# Patient Record
Sex: FEMALE | Race: WHITE | NOT HISPANIC OR LATINO | Employment: FULL TIME | URBAN - METROPOLITAN AREA
[De-identification: names, ages, dates, MRNs, and addresses within clinical notes are randomized per-mention and may not be internally consistent; named-entity substitution may affect disease eponyms.]

---

## 2017-02-08 ENCOUNTER — COMMUNICATION - HEALTHEAST (OUTPATIENT)
Dept: FAMILY MEDICINE | Facility: CLINIC | Age: 47
End: 2017-02-08

## 2017-02-08 DIAGNOSIS — E03.9 HYPOTHYROIDISM: ICD-10-CM

## 2017-05-19 ENCOUNTER — OFFICE VISIT - HEALTHEAST (OUTPATIENT)
Dept: FAMILY MEDICINE | Facility: CLINIC | Age: 47
End: 2017-05-19

## 2017-05-19 ENCOUNTER — RECORDS - HEALTHEAST (OUTPATIENT)
Dept: GENERAL RADIOLOGY | Facility: CLINIC | Age: 47
End: 2017-05-19

## 2017-05-19 DIAGNOSIS — R04.0 EPISTAXIS, RECURRENT: ICD-10-CM

## 2017-05-19 DIAGNOSIS — R05.9 COUGH: ICD-10-CM

## 2017-05-19 DIAGNOSIS — E03.9 HYPOTHYROIDISM: ICD-10-CM

## 2017-05-22 ENCOUNTER — COMMUNICATION - HEALTHEAST (OUTPATIENT)
Dept: FAMILY MEDICINE | Facility: CLINIC | Age: 47
End: 2017-05-22

## 2017-05-22 DIAGNOSIS — E03.9 HYPOTHYROID: ICD-10-CM

## 2017-06-01 ENCOUNTER — RECORDS - HEALTHEAST (OUTPATIENT)
Dept: ADMINISTRATIVE | Facility: OTHER | Age: 47
End: 2017-06-01

## 2018-06-06 ENCOUNTER — COMMUNICATION - HEALTHEAST (OUTPATIENT)
Dept: FAMILY MEDICINE | Facility: CLINIC | Age: 48
End: 2018-06-06

## 2018-06-18 ENCOUNTER — RECORDS - HEALTHEAST (OUTPATIENT)
Dept: ADMINISTRATIVE | Facility: OTHER | Age: 48
End: 2018-06-18

## 2018-08-10 ENCOUNTER — COMMUNICATION - HEALTHEAST (OUTPATIENT)
Dept: FAMILY MEDICINE | Facility: CLINIC | Age: 48
End: 2018-08-10

## 2018-09-17 ENCOUNTER — OFFICE VISIT - HEALTHEAST (OUTPATIENT)
Dept: FAMILY MEDICINE | Facility: CLINIC | Age: 48
End: 2018-09-17

## 2018-09-17 DIAGNOSIS — Z01.818 PREOP GENERAL PHYSICAL EXAM: ICD-10-CM

## 2018-09-17 DIAGNOSIS — Z12.31 VISIT FOR SCREENING MAMMOGRAM: ICD-10-CM

## 2018-09-17 DIAGNOSIS — E03.9 HYPOTHYROIDISM: ICD-10-CM

## 2018-09-17 LAB
ERYTHROCYTE [DISTWIDTH] IN BLOOD BY AUTOMATED COUNT: 11.6 % (ref 11–14.5)
HCG UR QL: NEGATIVE
HCT VFR BLD AUTO: 38.8 % (ref 35–47)
HGB BLD-MCNC: 13 G/DL (ref 12–16)
MCH RBC QN AUTO: 30.5 PG (ref 27–34)
MCHC RBC AUTO-ENTMCNC: 33.4 G/DL (ref 32–36)
MCV RBC AUTO: 91 FL (ref 80–100)
PLATELET # BLD AUTO: 271 THOU/UL (ref 140–440)
PMV BLD AUTO: 7 FL (ref 7–10)
RBC # BLD AUTO: 4.25 MILL/UL (ref 3.8–5.4)
SP GR UR STRIP: 1.01 (ref 1–1.03)
TSH SERPL DL<=0.005 MIU/L-ACNC: 0.2 UIU/ML (ref 0.3–5)
WBC: 4.2 THOU/UL (ref 4–11)

## 2018-09-17 ASSESSMENT — MIFFLIN-ST. JEOR: SCORE: 1214.17

## 2018-09-26 ENCOUNTER — RECORDS - HEALTHEAST (OUTPATIENT)
Dept: ADMINISTRATIVE | Facility: OTHER | Age: 48
End: 2018-09-26

## 2018-10-11 ENCOUNTER — COMMUNICATION - HEALTHEAST (OUTPATIENT)
Dept: FAMILY MEDICINE | Facility: CLINIC | Age: 48
End: 2018-10-11

## 2018-10-30 ENCOUNTER — HOSPITAL ENCOUNTER (OUTPATIENT)
Dept: MAMMOGRAPHY | Facility: CLINIC | Age: 48
Discharge: HOME | End: 2018-10-30
Attending: FAMILY MEDICINE

## 2018-10-30 DIAGNOSIS — Z12.31 VISIT FOR SCREENING MAMMOGRAM: ICD-10-CM

## 2018-12-21 ENCOUNTER — OFFICE VISIT - HEALTHEAST (OUTPATIENT)
Dept: FAMILY MEDICINE | Facility: CLINIC | Age: 48
End: 2018-12-21

## 2018-12-21 DIAGNOSIS — Z01.818 PREOP EXAMINATION: ICD-10-CM

## 2018-12-21 DIAGNOSIS — M21.621 TAILOR'S BUNION OF RIGHT FOOT: ICD-10-CM

## 2018-12-21 DIAGNOSIS — E06.3 HYPOTHYROIDISM DUE TO HASHIMOTO'S THYROIDITIS: ICD-10-CM

## 2018-12-21 LAB
HCG UR QL: NEGATIVE
HGB BLD-MCNC: 13.7 G/DL (ref 12–16)
SP GR UR STRIP: 1.02 (ref 1–1.03)

## 2018-12-21 ASSESSMENT — MIFFLIN-ST. JEOR: SCORE: 1227.78

## 2018-12-27 ENCOUNTER — RECORDS - HEALTHEAST (OUTPATIENT)
Dept: ADMINISTRATIVE | Facility: OTHER | Age: 48
End: 2018-12-27

## 2019-02-01 ENCOUNTER — RECORDS - HEALTHEAST (OUTPATIENT)
Dept: ADMINISTRATIVE | Facility: OTHER | Age: 49
End: 2019-02-01

## 2019-04-12 ENCOUNTER — RECORDS - HEALTHEAST (OUTPATIENT)
Dept: ADMINISTRATIVE | Facility: OTHER | Age: 49
End: 2019-04-12

## 2019-09-06 ENCOUNTER — OFFICE VISIT - HEALTHEAST (OUTPATIENT)
Dept: FAMILY MEDICINE | Facility: CLINIC | Age: 49
End: 2019-09-06

## 2019-09-06 DIAGNOSIS — E06.3 HYPOTHYROIDISM DUE TO HASHIMOTO'S THYROIDITIS: ICD-10-CM

## 2019-09-06 DIAGNOSIS — Z01.818 PREOP GENERAL PHYSICAL EXAM: ICD-10-CM

## 2019-09-06 DIAGNOSIS — Z12.11 SCREEN FOR COLON CANCER: ICD-10-CM

## 2019-09-06 LAB
ATRIAL RATE - MUSE: 69 BPM
DIASTOLIC BLOOD PRESSURE - MUSE: NORMAL MMHG
ERYTHROCYTE [DISTWIDTH] IN BLOOD BY AUTOMATED COUNT: 12.3 % (ref 11–14.5)
HCT VFR BLD AUTO: 39.8 % (ref 35–47)
HGB BLD-MCNC: 13.4 G/DL (ref 12–16)
INTERPRETATION ECG - MUSE: NORMAL
MCH RBC QN AUTO: 30.4 PG (ref 27–34)
MCHC RBC AUTO-ENTMCNC: 33.6 G/DL (ref 32–36)
MCV RBC AUTO: 90 FL (ref 80–100)
P AXIS - MUSE: 4 DEGREES
PLATELET # BLD AUTO: 327 THOU/UL (ref 140–440)
PMV BLD AUTO: 6.8 FL (ref 7–10)
PR INTERVAL - MUSE: 104 MS
QRS DURATION - MUSE: 82 MS
QT - MUSE: 410 MS
QTC - MUSE: 439 MS
R AXIS - MUSE: 39 DEGREES
RBC # BLD AUTO: 4.41 MILL/UL (ref 3.8–5.4)
SYSTOLIC BLOOD PRESSURE - MUSE: NORMAL MMHG
T AXIS - MUSE: 34 DEGREES
VENTRICULAR RATE- MUSE: 69 BPM
WBC: 4.5 THOU/UL (ref 4–11)

## 2019-09-06 ASSESSMENT — MIFFLIN-ST. JEOR: SCORE: 1232.31

## 2019-09-16 ENCOUNTER — COMMUNICATION - HEALTHEAST (OUTPATIENT)
Dept: FAMILY MEDICINE | Facility: CLINIC | Age: 49
End: 2019-09-16

## 2019-10-15 ENCOUNTER — COMMUNICATION - HEALTHEAST (OUTPATIENT)
Dept: FAMILY MEDICINE | Facility: CLINIC | Age: 49
End: 2019-10-15

## 2019-10-19 ENCOUNTER — COMMUNICATION - HEALTHEAST (OUTPATIENT)
Dept: FAMILY MEDICINE | Facility: CLINIC | Age: 49
End: 2019-10-19

## 2019-10-19 DIAGNOSIS — E06.3 HYPOTHYROIDISM DUE TO HASHIMOTO'S THYROIDITIS: ICD-10-CM

## 2019-11-01 ENCOUNTER — OFFICE VISIT - HEALTHEAST (OUTPATIENT)
Dept: FAMILY MEDICINE | Facility: CLINIC | Age: 49
End: 2019-11-01

## 2019-11-01 DIAGNOSIS — R05.9 COUGH: ICD-10-CM

## 2019-11-01 DIAGNOSIS — Z12.31 VISIT FOR SCREENING MAMMOGRAM: ICD-10-CM

## 2019-11-01 ASSESSMENT — MIFFLIN-ST. JEOR: SCORE: 1241.84

## 2019-11-15 ENCOUNTER — COMMUNICATION - HEALTHEAST (OUTPATIENT)
Dept: ADMINISTRATIVE | Facility: CLINIC | Age: 49
End: 2019-11-15

## 2020-10-30 ENCOUNTER — COMMUNICATION - HEALTHEAST (OUTPATIENT)
Dept: FAMILY MEDICINE | Facility: CLINIC | Age: 50
End: 2020-10-30

## 2020-10-30 DIAGNOSIS — E06.3 HYPOTHYROIDISM DUE TO HASHIMOTO'S THYROIDITIS: ICD-10-CM

## 2021-02-10 ENCOUNTER — OFFICE VISIT - HEALTHEAST (OUTPATIENT)
Dept: FAMILY MEDICINE | Facility: CLINIC | Age: 51
End: 2021-02-10

## 2021-02-10 DIAGNOSIS — R05.3 CHRONIC COUGH: ICD-10-CM

## 2021-02-10 DIAGNOSIS — Z12.11 SCREEN FOR COLON CANCER: ICD-10-CM

## 2021-02-10 DIAGNOSIS — Z12.31 VISIT FOR SCREENING MAMMOGRAM: ICD-10-CM

## 2021-02-10 DIAGNOSIS — Z00.00 ROUTINE GENERAL MEDICAL EXAMINATION AT A HEALTH CARE FACILITY: ICD-10-CM

## 2021-02-10 DIAGNOSIS — E06.3 HYPOTHYROIDISM DUE TO HASHIMOTO'S THYROIDITIS: ICD-10-CM

## 2021-02-10 LAB
ALBUMIN SERPL-MCNC: 3.9 G/DL (ref 3.5–5)
ALP SERPL-CCNC: 96 U/L (ref 45–120)
ALT SERPL W P-5'-P-CCNC: 23 U/L (ref 0–45)
ANION GAP SERPL CALCULATED.3IONS-SCNC: 7 MMOL/L (ref 5–18)
AST SERPL W P-5'-P-CCNC: 19 U/L (ref 0–40)
BILIRUB SERPL-MCNC: 0.5 MG/DL (ref 0–1)
BUN SERPL-MCNC: 14 MG/DL (ref 8–22)
CALCIUM SERPL-MCNC: 9.1 MG/DL (ref 8.5–10.5)
CHLORIDE BLD-SCNC: 109 MMOL/L (ref 98–107)
CHOLEST SERPL-MCNC: 272 MG/DL
CO2 SERPL-SCNC: 24 MMOL/L (ref 22–31)
CREAT SERPL-MCNC: 0.68 MG/DL (ref 0.6–1.1)
ERYTHROCYTE [DISTWIDTH] IN BLOOD BY AUTOMATED COUNT: 11.9 % (ref 11–14.5)
FASTING STATUS PATIENT QL REPORTED: YES
GFR SERPL CREATININE-BSD FRML MDRD: >60 ML/MIN/1.73M2
GLUCOSE BLD-MCNC: 96 MG/DL (ref 70–125)
HCT VFR BLD AUTO: 42 % (ref 35–47)
HDLC SERPL-MCNC: 66 MG/DL
HGB BLD-MCNC: 13.8 G/DL (ref 12–16)
LDLC SERPL CALC-MCNC: 192 MG/DL
MCH RBC QN AUTO: 30.3 PG (ref 27–34)
MCHC RBC AUTO-ENTMCNC: 32.9 G/DL (ref 32–36)
MCV RBC AUTO: 92 FL (ref 80–100)
PLATELET # BLD AUTO: 287 THOU/UL (ref 140–440)
PMV BLD AUTO: 8.7 FL (ref 7–10)
POTASSIUM BLD-SCNC: 4.1 MMOL/L (ref 3.5–5)
PROT SERPL-MCNC: 6.7 G/DL (ref 6–8)
RBC # BLD AUTO: 4.56 MILL/UL (ref 3.8–5.4)
SODIUM SERPL-SCNC: 140 MMOL/L (ref 136–145)
TRIGL SERPL-MCNC: 71 MG/DL
TSH SERPL DL<=0.005 MIU/L-ACNC: 0.14 UIU/ML (ref 0.3–5)
WBC: 4.3 THOU/UL (ref 4–11)

## 2021-02-10 RX ORDER — PREDNISONE 10 MG/1
TABLET ORAL
Qty: 30 TABLET | Refills: 0 | Status: SHIPPED | OUTPATIENT
Start: 2021-02-10 | End: 2022-01-19

## 2021-02-10 RX ORDER — LEVOTHYROXINE SODIUM 112 UG/1
TABLET ORAL
Qty: 90 TABLET | Refills: 3 | Status: SHIPPED | OUTPATIENT
Start: 2021-02-10 | End: 2022-02-16

## 2021-02-10 ASSESSMENT — MIFFLIN-ST. JEOR: SCORE: 1245.01

## 2021-02-11 LAB
HPV SOURCE: NORMAL
HUMAN PAPILLOMA VIRUS 16 DNA: NEGATIVE
HUMAN PAPILLOMA VIRUS 18 DNA: NEGATIVE
HUMAN PAPILLOMA VIRUS FINAL DIAGNOSIS: NORMAL
HUMAN PAPILLOMA VIRUS OTHER HR: NEGATIVE
SPECIMEN DESCRIPTION: NORMAL

## 2021-02-17 LAB
BKR LAB AP ABNORMAL BLEEDING: NO
BKR LAB AP BIRTH CONTROL/HORMONES: NORMAL
BKR LAB AP CERVICAL APPEARANCE: NORMAL
BKR LAB AP GYN ADEQUACY: NORMAL
BKR LAB AP GYN INTERPRETATION: NORMAL
BKR LAB AP HPV REFLEX: NORMAL
BKR LAB AP LMP: NORMAL
BKR LAB AP PATIENT STATUS: NO
BKR LAB AP PREVIOUS ABNORMAL: NO
BKR LAB AP PREVIOUS NORMAL: NORMAL
HIGH RISK?: NO
PATH REPORT.COMMENTS IMP SPEC: NORMAL
RESULT FLAG (HE HISTORICAL CONVERSION): NORMAL

## 2021-03-25 ENCOUNTER — RECORDS - HEALTHEAST (OUTPATIENT)
Dept: ADMINISTRATIVE | Facility: OTHER | Age: 51
End: 2021-03-25

## 2021-04-09 ENCOUNTER — COMMUNICATION - HEALTHEAST (OUTPATIENT)
Dept: FAMILY MEDICINE | Facility: CLINIC | Age: 51
End: 2021-04-09

## 2021-04-09 DIAGNOSIS — R05.3 CHRONIC COUGH: ICD-10-CM

## 2021-04-13 ENCOUNTER — HOSPITAL ENCOUNTER (OUTPATIENT)
Dept: MAMMOGRAPHY | Facility: CLINIC | Age: 51
Discharge: HOME | End: 2021-04-13
Attending: FAMILY MEDICINE

## 2021-04-13 DIAGNOSIS — Z12.31 VISIT FOR SCREENING MAMMOGRAM: ICD-10-CM

## 2021-04-15 ENCOUNTER — COMMUNICATION - HEALTHEAST (OUTPATIENT)
Dept: FAMILY MEDICINE | Facility: CLINIC | Age: 51
End: 2021-04-15

## 2021-05-31 VITALS — BODY MASS INDEX: 26.64 KG/M2 | WEIGHT: 141 LBS

## 2021-06-01 NOTE — PROGRESS NOTES
Preoperative Exam    Scheduled Procedure: Remove breast implants  Surgery Date:  9/18/19  Surgery Location: [facility] - fax [phone]    Surgeon:  Dr. Cox    Assessment/Plan:     1. Preop general physical exam  Patient is approved for surgery with general anesthesia.  EKG and CBC are normal today.  She was instructed to hold all supplements 1 week prior to procedure.  Her only medication is Synthroid and she may take that the morning of surgery.  - Electrocardiogram Perform - Clinic  - HM2(CBC w/o Differential)    2. Hypothyroidism due to Hashimoto's thyroiditis  Stable on current dose medication.    3. Screen for colon cancer  She is due for colonoscopy so referral was placed today.  - Ambulatory referral for Colonoscopy     Surgical Procedure Risk: Low (reported cardiac risk generally < 1%)  Have you had prior anesthesia?: Yes  Have you or any family members had a previous anesthesia reaction:  No  Do you or any family members have a history of a clotting or bleeding disorder?: No  Cardiac Risk Assessment: no increased risk for major cardiac complications    APPROVAL GIVEN to proceed with proposed procedure, without further diagnostic evaluation      Functional Status: Independent  Patient plans to recover at home with family.     Subjective:      Dyan Lockett is a 49 y.o. female who presents for a preoperative consultation.  She is planning on having her breast implants removed.  She has had multiple surgeries without any issues with anesthesia.  Her only chronic medical condition is hypothyroidism which is currently under good control with her current dose of medication.  She said no recent illnesses or exposures.  She has no known coronary artery disease and denies chest pain or shortness of breath.  She has no limitations to her activities.  She is a never smoker.  She is up-to-date on immunizations.    All other systems reviewed and are negative, other than those listed in the  HPI.    Pertinent History  Do you have difficulty breathing or chest pain after walking up a flight of stairs: No  History of obstructive sleep apnea: No  Steroid use in the last 6 months: No  Frequent Aspirin/NSAID use: No  Prior Blood Transfusion: No  Prior Blood Transfusion Reaction: No  If for some reason prior to, during or after the procedure, if it is medically indicated, would you be willing to have a blood transfusion?:  There is no transfusion refusal.    Current Outpatient Medications   Medication Sig Dispense Refill     levothyroxine (SYNTHROID, LEVOTHROID) 112 MCG tablet TAKE 1 TABLET BY MOUTH EVERY DAY - DUE TO BE SEEN 90 tablet 3     No current facility-administered medications for this visit.         No Known Allergies    Patient Active Problem List   Diagnosis     Hypothyroidism     Allergies       No past medical history on file.    Past Surgical History:   Procedure Laterality Date     AUGMENTATION MAMMAPLASTY  2000     AR CORRJ HALLUX VALGUS W/SESMDC W/DIST METAR OSTEOT      Description: Bunion Correction With Metatarsal Osteotomy;  Recorded: 10/09/2008;  Comments: and tendon release     AR CORRJ HALLUX VALGUS W/SESMDC W/RESCJ PROX PHAL      Description: Bunion Correction By Cheilectomy;  Recorded: 10/09/2008;  Comments: right x3     AR ENLARGE BREAST      Description: Breast Surgery Enlargement Procedure Bilateral;  Recorded: 10/09/2008;     AR LIGATE FALLOPIAN TUBE      Description: Tubal Ligation;  Recorded: 10/09/2008;     AR XFER SINGLE SUPERFICI LOW LEG TENDON      Description: Foot Repair Tendon Transfer Superficial;  Recorded: 10/09/2008;  Comments: right       Social History     Socioeconomic History     Marital status:      Spouse name: Not on file     Number of children: Not on file     Years of education: Not on file     Highest education level: Not on file   Occupational History     Not on file   Social Needs     Financial resource strain: Not on file     Food insecurity:      "Worry: Not on file     Inability: Not on file     Transportation needs:     Medical: Not on file     Non-medical: Not on file   Tobacco Use     Smoking status: Never Smoker     Smokeless tobacco: Never Used   Substance and Sexual Activity     Alcohol use: Not on file     Drug use: Not on file     Sexual activity: Not on file   Lifestyle     Physical activity:     Days per week: Not on file     Minutes per session: Not on file     Stress: Not on file   Relationships     Social connections:     Talks on phone: Not on file     Gets together: Not on file     Attends Rastafari service: Not on file     Active member of club or organization: Not on file     Attends meetings of clubs or organizations: Not on file     Relationship status: Not on file     Intimate partner violence:     Fear of current or ex partner: Not on file     Emotionally abused: Not on file     Physically abused: Not on file     Forced sexual activity: Not on file   Other Topics Concern     Not on file   Social History Narrative     Not on file       Patient Care Team:  Leah Shetty MD as PCP - General  Leah Shetty MD as Assigned PCP          Objective:     Vitals:    09/06/19 0916   BP: 110/68   Pulse: 76   Resp: 16   Temp: 98.1  F (36.7  C)   Weight: 151 lb (68.5 kg)   Height: 5' 1\" (1.549 m)         Physical Exam:  Physical Exam    Physical Exam:  General Appearance: Alert, cooperative, no distress, appears stated age  Head: Normocephalic, without obvious abnormality, atraumatic  Eyes: PERRL, conjunctiva/corneas clear, EOM's intact  Ears: Normal TM's and external ear canals, both ears  Nose: Nares normal, septum midline,mucosa normal, no drainage  Throat: Lips, mucosa, and tongue normal; teeth and gums normal  Neck: Supple, symmetrical, trachea midline, no adenopathy; thyroid: not enlarged, symmetric, no tenderness/mass/nodules; no carotid bruit  Back: Symmetric, no curvature, ROM normal, no CVA tenderness  Lungs: Clear to auscultation " bilaterally, respirations unlabored  Heart: Regular rate and rhythm, S1 and S2 normal, no murmur, rub, or gallop,   Abdomen: Soft, non-tender, bowel sounds active all four quadrants,  no masses, no organomegaly  Extremities: Extremities normal, atraumatic, no cyanosis or edema  Skin: Skin color, texture, turgor normal, no rashes or lesions  Lymph nodes: Cervical, supraclavicular, and axillary nodes normal  Neurologic: Normal        Patient Instructions     Hold all supplements, aspirin and NSAIDs for 7 days prior to surgery.  Follow your surgeon's direction on when to stop eating and drinking prior to surgery.  Your surgeon will be managing your pain after your surgery.        EKG: Personally reviewed: Normal sinus rhythm, normal EKG    Labs:  Recent Results (from the past 24 hour(s))   Electrocardiogram Perform - Clinic    Collection Time: 09/06/19  9:30 AM   Result Value Ref Range    SYSTOLIC BLOOD PRESSURE  mmHg    DIASTOLIC BLOOD PRESSURE  mmHg    VENTRICULAR RATE 69 BPM    ATRIAL RATE 69 BPM    P-R INTERVAL 104 ms    QRS DURATION 82 ms    Q-T INTERVAL 410 ms    QTC CALCULATION (BEZET) 439 ms    P Axis 4 degrees    R AXIS 39 degrees    T AXIS 34 degrees    MUSE DIAGNOSIS       Sinus rhythm with short MO  Otherwise normal ECG  No previous ECGs available     HM2(CBC w/o Differential)    Collection Time: 09/06/19  9:45 AM   Result Value Ref Range    WBC 4.5 4.0 - 11.0 thou/uL    RBC 4.41 3.80 - 5.40 mill/uL    Hemoglobin 13.4 12.0 - 16.0 g/dL    Hematocrit 39.8 35.0 - 47.0 %    MCV 90 80 - 100 fL    MCH 30.4 27.0 - 34.0 pg    MCHC 33.6 32.0 - 36.0 g/dL    RDW 12.3 11.0 - 14.5 %    Platelets 327 140 - 440 thou/uL    MPV 6.8 (L) 7.0 - 10.0 fL       Immunization History   Administered Date(s) Administered     DT (pediatric) 01/01/1999, 02/01/1999     Hep A, historic 01/13/2005, 11/03/2011     Hep B, historic 03/04/2014     Influenza, Seasonal, Inj PF IIV3 10/10/2013, 10/04/2016     Influenza,seasonal quad, PF, =/>  6months 10/03/2017     Influenza,seasonal, Inj IIV3 09/24/2015     Td,adult,historic,unspecified 07/10/2008     Tdap 07/10/2008, 09/17/2018           Electronically signed by Leah Shetty MD 09/06/19 9:18 AM

## 2021-06-02 ENCOUNTER — RECORDS - HEALTHEAST (OUTPATIENT)
Dept: ADMINISTRATIVE | Facility: CLINIC | Age: 51
End: 2021-06-02

## 2021-06-02 VITALS — WEIGHT: 147 LBS | BODY MASS INDEX: 27.75 KG/M2 | HEIGHT: 61 IN

## 2021-06-02 VITALS — BODY MASS INDEX: 28.32 KG/M2 | WEIGHT: 150 LBS | HEIGHT: 61 IN

## 2021-06-02 NOTE — TELEPHONE ENCOUNTER
RN cannot approve Refill Request    RN can NOT refill this medication Protocol failed and NO refill given. Last office visit: 5/19/2017 Leah Shetty MD Last Physical: 9/6/2019 Last MTM visit: Visit date not found Last visit same specialty: 5/19/2017 Leah Shetty MD.  Next visit within 3 mo: Visit date not found  Next physical within 3 mo: Visit date not found      Chikis Gama, [facility] Triage/Med Refill 10/19/2019    Requested Prescriptions   Pending Prescriptions Disp Refills     levothyroxine (SYNTHROID, LEVOTHROID) 112 MCG tablet [Pharmacy Med Name: LEVOTHYROXINE 112 MCG TABLET] 90 tablet 3     Sig: TAKE 1 TABLET BY MOUTH EVERY DAY - DUE TO BE SEEN       Thyroid Hormones Protocol Failed - 10/19/2019  9:13 AM        Failed - TSH on file in past 12 months for patient age 12 & older     TSH   Date Value Ref Range Status   09/17/2018 0.20 (L) 0.30 - 5.00 uIU/mL Final                   Passed - Provider visit in past 12 months or next 3 months     Last office visit with prescriber/PCP: 5/19/2017 Leah Shetty MD OR same dept: Visit date not found OR same specialty: 5/19/2017 Leah Shetty MD  Last physical: 9/6/2019 Last MTM visit: Visit date not found   Next visit within 3 mo: Visit date not found  Next physical within 3 mo: Visit date not found  Prescriber OR PCP: Leah Shetty MD  Last diagnosis associated with med order: There are no diagnoses linked to this encounter.  If protocol passes may refill for 12 months if within 3 months of last provider visit (or a total of 15 months).

## 2021-06-02 NOTE — PATIENT INSTRUCTIONS - HE
Antibiotic as directed.  Fluticasone nasal spray as directed.    Benzonatate for cough as needed.  Delsym for cough as needed.    May consider   1.  Antihistamine such as claritin or zyrtec.  2.  Acid reducer such zantac or prilosec.  3. Decongestant such as sudafed.

## 2021-06-02 NOTE — PROGRESS NOTES
"  Chief Complaint   Patient presents with     Cough     for over 6 weeks          HPI:   Dyan Lockett is a 49 y.o. female c/o cough for 6 weeks.  Started with bad cold-the other symptoms improved.  The cough persists.  Mildly productive.  Mild shortness of breath. More fatigue.  No tobacco use. No h/o asthma.  No trouble with allergies this fall.   and daughter had cold and are not coughing any more.    Had temp around 100 for about one week after.  But does feel warm and cold.      Slight amount head congestion.  Occasionally runny nose when she first gets up.  No post nasal drainage.  No ear pain.  No sore throat.  No chest pain.    Has had some heartburn recently.  No sour taste in back of mouth.    Nyquil/dayquil--helps some.            ROS:  A 10 point comprehensive review of systems was negative except as noted.     Medications:  Current Outpatient Medications on File Prior to Visit   Medication Sig Dispense Refill     levothyroxine (SYNTHROID, LEVOTHROID) 112 MCG tablet TAKE 1 TABLET BY MOUTH EVERY DAY - DUE TO BE SEEN 90 tablet 3     No current facility-administered medications on file prior to visit.          Social History:  Social History     Tobacco Use     Smoking status: Never Smoker     Smokeless tobacco: Never Used   Substance Use Topics     Alcohol use: Not on file         Physical Exam:   Vitals:    11/01/19 0949   BP: 90/64   Pulse: 84   Resp: 14   Temp: 97.6  F (36.4  C)   TempSrc: Oral   SpO2: 98%   Weight: 153 lb 1.6 oz (69.4 kg)   Height: 5' 1\" (1.549 m)       GENERAL:   Alert. Oriented.  EYES: Clear  HENT:  Ears: R TM pearly gray. Normal landmarks. L TM pearly gray.  Normal landmarks  Nose: Clear.  Sinuses: Nontender.  Oropharynx:  No erythema. No exudate.  NECK: Supple. No adenopathy.  LUNGS: Clear to ascultation.  No crackles.  No wheezing  HEART: RRR  SKIN:  No rash.         Assessment/Plan:    1. Cough  doxycycline (MONODOX) 100 MG capsule    fluticasone propionate (FLONASE) " 50 mcg/actuation nasal spray    benzonatate (TESSALON) 100 MG capsule        Discussed causes of prolonged cough--post viral, post nasal drainage due to allergies or sinusitis, GE reflux, pertussis  She has essentially negative exam.  No alarming symptoms on history.  Will treat with course of antibiotics.  Start steroid nasal spray.  Dextromethorphan and/or benzonatate for cough as needed.  May try antihistamine, decongestant or acid blocker.    Recheck if worsening or not improving        Justus Marroquin MD      11/1/2019    The following portions of the patient's history were reviewed and updated as appropriate: allergies, current medications, past family history, past medical history, past social history, past surgical history and problem list.

## 2021-06-03 VITALS
DIASTOLIC BLOOD PRESSURE: 68 MMHG | WEIGHT: 151 LBS | RESPIRATION RATE: 16 BRPM | BODY MASS INDEX: 28.51 KG/M2 | TEMPERATURE: 98.1 F | SYSTOLIC BLOOD PRESSURE: 110 MMHG | HEART RATE: 76 BPM | HEIGHT: 61 IN

## 2021-06-03 VITALS
WEIGHT: 153.1 LBS | SYSTOLIC BLOOD PRESSURE: 90 MMHG | TEMPERATURE: 97.6 F | HEART RATE: 84 BPM | HEIGHT: 61 IN | DIASTOLIC BLOOD PRESSURE: 64 MMHG | OXYGEN SATURATION: 98 % | BODY MASS INDEX: 28.9 KG/M2 | RESPIRATION RATE: 14 BRPM

## 2021-06-05 VITALS
HEART RATE: 88 BPM | WEIGHT: 153.8 LBS | BODY MASS INDEX: 29.04 KG/M2 | SYSTOLIC BLOOD PRESSURE: 112 MMHG | HEIGHT: 61 IN | DIASTOLIC BLOOD PRESSURE: 72 MMHG | RESPIRATION RATE: 16 BRPM

## 2021-06-10 NOTE — PROGRESS NOTES
Assessment/ Plan     1. Cough    Patient symptoms and exam are consistent with post viral cough.  Her chest x-ray was normal.  Pertussis is pending.  She had a normal white blood cell count.  We discussed options today for treatment and because she is going on a trip in about 2 weeks, she would prefer to be more aggressive with treatment.  We will do a prednisone taper along with an albuterol inhaler.  Reviewed potential side effects and inhaler technique.  Follow-up if symptoms are not improving.  - XR Chest PA and Lateral; Future  - Bordetella pertussis PCR  - HM1 (CBC and Differential)  - HM1 (CBC with Diff)  - predniSONE (DELTASONE) 10 mg tablet; 40mgs po x 3 days, 30 mgs pox 3 days, 20 mgs [id number], 10 mgs pox 3 days.  Dispense: 40 tablet; Refill: 0  - albuterol (PROAIR HFA;PROVENTIL HFA;VENTOLIN HFA) 90 mcg/actuation inhaler; Inhale 2 puffs every 6 (six) hours as needed for wheezing.  Dispense: 1 Inhaler; Refill: 0    2. Hypothyroidism  Patient is overdue for blood work.  - Thyroid Stimulating Hormone (TSH)    3. Epistaxis, recurrent  She has a history of recurrent epistaxis from the left nares.  I did not see anything to cauterize today.  Offered referral to ENT but because she has a high deductible insurance, she will hold off on now and let me know if she wants referral later.      Subjective:       Dyan Lockett is a 47 y.o. female who presents for evaluation of a cough.  Patient states she had a typical URI back in March.  All her other symptoms resolved except for the cough.  The cough actually seemed to get better for almost a week or 2 and then came back again.  It seems to get worse than later she gets in the day or the more she talks.  She has not had any fevers, shortness of breath, or chest pain.  She has tried over-the-counter Claritin without much success.  She does tend to have some seasonal allergies.  She has no history of asthma or wheezing.  She has never been a smoker.  It is not  keeping her up at night.  She does cough when she is trying to fall asleep, but when she is asleep she sleeps fine.  She is just concerned she is going to [place] in 2 weeks and does not want to be dealing with a cough during her vacation.  She also has recurrent epistaxis from her left nares.  She is able to stop it with pressure.  She states her mom had the same thing and had cautery performed.  The bleeding does not seem excessive.    The following portions of the patient's history were reviewed and updated as appropriate: allergies, current medications, past family history, past medical history, past social history, past surgical history and problem list.    Review of Systems   A 12 point comprehensive review of systems was negative except as noted.      Current Outpatient Prescriptions   Medication Sig Dispense Refill     levothyroxine (SYNTHROID, LEVOTHROID) 100 MCG tablet TAKE ONE TABLET BY MOUTH ONE TIME DAILY 90 tablet 1     albuterol (PROAIR HFA;PROVENTIL HFA;VENTOLIN HFA) 90 mcg/actuation inhaler Inhale 2 puffs every 6 (six) hours as needed for wheezing. 1 Inhaler 0     predniSONE (DELTASONE) 10 mg tablet 40mgs po x 3 days, 30 mgs pox 3 days, 20 mgs [id number], 10 mgs pox 3 days. 40 tablet 0     No current facility-administered medications for this visit.        Objective:      BP 90/70 (Patient Site: Right Arm, Patient Position: Sitting, Cuff Size: Adult Regular)  Pulse 82  Resp 16  Wt 141 lb (64 kg)  LMP 05/05/2017  BMI 26.64 kg/m2      General appearance: alert, appears stated age and cooperative  Head: Normocephalic, without obvious abnormality, atraumatic  Eyes: conjunctivae/corneas clear.   Ears: normal TM's and external ear canals both ears  Nose: Nares normal. Septum midline. Mucosa normal. No drainage or sinus tenderness.  Do not see any area where the epistaxis is coming from, no dilated vessels are scabbing.  Throat: lips, mucosa, and tongue normal; teeth and gums normal  Neck: no  adenopathy  Lungs: clear to auscultation bilaterally  Chest x-ray: Personally reviewed, negative for infiltrate.    Recent Results (from the past 168 hour(s))   HM1 (CBC with Diff)   Result Value Ref Range    WBC 4.7 4.0 - 11.0 thou/uL    RBC 4.49 3.80 - 5.40 mill/uL    Hemoglobin 13.7 12.0 - 16.0 g/dL    Hematocrit 41.6 35.0 - 47.0 %    MCV 93 80 - 100 fL    MCH 30.4 27.0 - 34.0 pg    MCHC 32.9 32.0 - 36.0 g/dL    RDW 11.3 11.0 - 14.5 %    Platelets 232 140 - 440 thou/uL    MPV 8.2 7.0 - 10.0 fL    Neutrophils % 55 50 - 70 %    Lymphocytes % 33 20 - 40 %    Monocytes % 9 2 - 10 %    Eosinophils % 4 0 - 6 %    Basophils % 1 0 - 2 %    Neutrophils Absolute 2.6 2.0 - 7.7 thou/uL    Lymphocytes Absolute 1.5 0.8 - 4.4 thou/uL    Monocytes Absolute 0.4 0.0 - 0.9 thou/uL    Eosinophils Absolute 0.2 0.0 - 0.4 thou/uL    Basophils Absolute 0.0 0.0 - 0.2 thou/uL          This note has been dictated using voice recognition software. Any grammatical or context distortions are unintentional and inherent to the software

## 2021-06-12 NOTE — TELEPHONE ENCOUNTER
RN cannot approve Refill Request    RN can NOT refill this medication Protocol failed and NO refill given. Last office visit: 5/19/2017 Leah Shetty MD Last Physical: 9/6/2019 Last MTM visit: Visit date not found Last visit same specialty: 11/1/2019 Justus Marroquin MD.  Next visit within 3 mo: Visit date not found  Next physical within 3 mo: Visit date not found      Valarie Waggoner, [facility] Triage/Med Refill 11/2/2020    Requested Prescriptions   Pending Prescriptions Disp Refills     levothyroxine (SYNTHROID, LEVOTHROID) 112 MCG tablet [Pharmacy Med Name: LEVOTHYROXINE SODIUM 112MCG TABS] 90 tablet 2     Sig: TAKE ONE TABLET BY MOUTH ONCE DAILY       Thyroid Hormones Protocol Failed - 10/30/2020  8:50 AM        Failed - Provider visit in past 12 months or next 3 months     Last office visit with prescriber/PCP: 5/19/2017 Leah Shetty MD OR same dept: Visit date not found OR same specialty: 11/1/2019 Justus Marroquin MD  Last physical: 9/6/2019 Last MTM visit: Visit date not found   Next visit within 3 mo: Visit date not found  Next physical within 3 mo: Visit date not found  Prescriber OR PCP: Leah Shetty MD  Last diagnosis associated with med order: 1. Hypothyroidism due to Hashimoto's thyroiditis  - levothyroxine (SYNTHROID, LEVOTHROID) 112 MCG tablet [Pharmacy Med Name: LEVOTHYROXINE SODIUM 112MCG TABS]; TAKE ONE TABLET BY MOUTH ONCE DAILY  Dispense: 90 tablet; Refill: 2    If protocol passes may refill for 12 months if within 3 months of last provider visit (or a total of 15 months).             Failed - TSH on file in past 12 months for patient age 12 & older     TSH   Date Value Ref Range Status   09/17/2018 0.20 (L) 0.30 - 5.00 uIU/mL Final

## 2021-06-15 NOTE — PROGRESS NOTES
Assessment/ Plan     1. Routine general medical examination at a health care facility  Dyan presents for her annual exam.  She is overdue for Pap smear so this was sent today.  She also due for mammogram and colonoscopy.  We will do fasting blood work today.  She is up-to-date on immunizations.  - Gynecologic Cytology (PAP Smear)  - Comprehensive Metabolic Panel  - HM2(CBC w/o Differential)  - Lipid Cascade  - Thyroid Stimulating Hormone (TSH)    2. Hypothyroidism due to Hashimoto's thyroiditis  her thyroid has been fairly stable.  We will send refills once her TSH is back tomorrow.    - levothyroxine (SYNTHROID, LEVOTHROID) 112 MCG tablet; TAKE ONE TABLET BY MOUTH ONCE DAILY  Dispense: 90 tablet; Refill: 3  - Lipid Depue    3. Visit for screening mammogram  - Mammo Screening Bilateral; Future    4. Screen for colon cancer  - Ambulatory referral for Colonoscopy    5. Chronic cough  she has a recurrent chronic cough over the years.  In 2017 we did a prednisone taper and that worked really well for her.  She states the Flonase is helpful, but she tends to get bloody noses.  We will have her use Vaseline to her nares at night and use the Flonase in the morning to see if she can tolerate it better.  Reviewed potential adverse side effects of the prednisone.  She is done well with it in the past.  If this helps and cough returns, could consider an inhaled steroid as more of a preventative.  She will keep me posted on symptoms.  She had a normal chest x-ray when we evaluated her in 2017.  She is low risk non-smoker.  Could also consider Singulair.  - predniSONE (DELTASONE) 10 mg tablet; Take 40mg by mouth daily for 3 days, then 30mg x 3 days, then 20mg x 3 days ,then 10mg x 3 days then stop.  Dispense: 30 tablet; Refill: 0      Subjective:     Dyan Lockett is a 50 y.o. female who presents for an annual exam.  She has not been for physical: In quite some time so she is due for all her preventative cares.  She has  this return of her chronic cough.  It is more of a nagging dry cough.  She does not feel ill with it.  She is having shortness of breath.  She states the Flonase will help, but then she gets bloody noses so has to stop using it.  She thinks is likely due to postnasal drip and also some allergies.  She was using Zyrtec and that was helpful and then switched to Claritin which has been less helpful.  She is a never smoker.  She states she has had this on and off for years.  Reviewed the chart shows that in 2017 we did a prednisone taper.  She states that took care of it instantly but did not return for many many months.  She otherwise has hypothyroidism which has been fairly stable.  She has now been postmenopausal for about 3 years.  She still occasionally getting a hot flash, but this is manageable.    Healthy Habits:   Regular Exercise: Yes  Sunscreen Use: Yes  Healthy Diet: Yes  Dental Visits Regularly: Yes  Seat Belt: Yes  Sexually active: Yes  Self Breast Exam Monthly:Yes and No  Colonoscopy: No  Lipid Profile: Yes  Glucose Screen: Yes  Prevention of Osteoporosis: Yes  Last Dexa: No        Immunization History   Administered Date(s) Administered     DT (pediatric) 1999, 1999     Hep A, historic 2005, 2011     Hep B, historic 2014     INFLUENZA,SEASONAL QUAD, PF, =/> 6months 10/03/2017     Influenza, Seasonal, Inj PF IIV3 10/10/2013, 10/04/2016     Influenza,seasonal, Inj IIV3 2015     Influenza,seasonal,quad inj =/> 6months 10/16/2019     Td,adult,historic,unspecified 07/10/2008     Tdap 07/10/2008, 2018     Immunization status: up to date and documented.     Gynecologic History  No LMP recorded (lmp unknown). Patient is postmenopausal.  Contraception: none  Last Pap: . Results were: normal  Last mammogram: . Results were: normal      OB History    Para Term  AB Living   3 3 3         SAB TAB Ectopic Multiple Live Births                  # Outcome  Date GA Lbr Fox/2nd Weight Sex Delivery Anes PTL Lv   3 Term            2 Term            1 Term                Current Outpatient Medications   Medication Sig Dispense Refill     FA/mv,Ca,iron,min/lycopene/lut (MULTIVITAL ORAL) Take by mouth.       fluticasone propionate (FLONASE) 50 mcg/actuation nasal spray 2 sprays into each nostril daily. 16 g 11     levothyroxine (SYNTHROID, LEVOTHROID) 112 MCG tablet TAKE ONE TABLET BY MOUTH ONCE DAILY 90 tablet 3     predniSONE (DELTASONE) 10 mg tablet Take 40mg by mouth daily for 3 days, then 30mg x 3 days, then 20mg x 3 days ,then 10mg x 3 days then stop. 30 tablet 0     No current facility-administered medications for this visit.      No past medical history on file.  Past Surgical History:   Procedure Laterality Date     AUGMENTATION MAMMAPLASTY  2000     SD CORRJ HALLUX VALGUS W/SESMDC W/DIST METAR OSTEOT      Description: Bunion Correction With Metatarsal Osteotomy;  Recorded: 10/09/2008;  Comments: and tendon release     SD CORRJ HALLUX VALGUS W/SESMDC W/RESCJ PROX PHAL      Description: Bunion Correction By Cheilectomy;  Recorded: 10/09/2008;  Comments: right x3     SD ENLARGE BREAST      Description: Breast Surgery Enlargement Procedure Bilateral;  Recorded: 10/09/2008;     SD LIGATE FALLOPIAN TUBE      Description: Tubal Ligation;  Recorded: 10/09/2008;     SD XFER SINGLE SUPERFICI LOW LEG TENDON      Description: Foot Repair Tendon Transfer Superficial;  Recorded: 10/09/2008;  Comments: right     Patient has no known allergies.  Family History   Problem Relation Age of Onset     No Medical Problems Mother      No Medical Problems Father      Breast cancer Neg Hx      Social History     Socioeconomic History     Marital status:      Spouse name: Not on file     Number of children: Not on file     Years of education: Not on file     Highest education level: Not on file   Occupational History     Not on file   Social Needs     Financial resource strain: Not on  "file     Food insecurity     Worry: Not on file     Inability: Not on file     Transportation needs     Medical: Not on file     Non-medical: Not on file   Tobacco Use     Smoking status: Never Smoker     Smokeless tobacco: Never Used   Substance and Sexual Activity     Alcohol use: Yes     Alcohol/week: 2.0 standard drinks     Types: 2 Glasses of wine per week     Drug use: Not on file     Sexual activity: Not on file   Lifestyle     Physical activity     Days per week: Not on file     Minutes per session: Not on file     Stress: Not on file   Relationships     Social connections     Talks on phone: Not on file     Gets together: Not on file     Attends [employer] service: Not on file     Active member of club or organization: Not on file     Attends meetings of clubs or organizations: Not on file     Relationship status: Not on file     Intimate partner violence     Fear of current or ex partner: Not on file     Emotionally abused: Not on file     Physically abused: Not on file     Forced sexual activity: Not on file   Other Topics Concern     Not on file   Social History Narrative     Not on file       Review of Systems  General:  Denies problems  Eyes:  Denies problems  Ears/Nose/Throat:  Denies problems  Cardiovascular:  Denies problems  Respiratory:  Denies problems  Gastrointestinal:  Denies problems  Genitourinary:  Denies problems  Musculoskeletal:  Denies problems  Skin:  Denies problems  Neurologic:  Denies problems  Psychiatric:  Denies problems  Endocrine:  Denies problems  Heme/Lymphatic:  Denies problems  Allergic/Immunologic:  Denies problems    Objective:      Vitals:    02/10/21 0804   BP: 112/72   Pulse: 88   Resp: 16   Weight: 153 lb 12.8 oz (69.8 kg)   Height: 5' 1\" (1.549 m)       Physical Exam:  General Appearance: Alert, cooperative, no distress, appears stated age  Head: Normocephalic, without obvious abnormality, atraumatic  Eyes: PERRL, conjunctiva/corneas clear, EOM's intact  Ears: Normal " TM's and external ear canals, both ears  Nose: Nares normal, septum midline,mucosa normal, no drainage  Throat: Lips, mucosa, and tongue normal; teeth and gums normal  Neck: Supple, symmetrical, trachea midline, no adenopathy; thyroid: not enlarged, symmetric, no tenderness/mass/nodules; no carotid bruit  Back: Symmetric, no curvature, ROM normal, no CVA tenderness  Lungs: Clear to auscultation bilaterally, respirations unlabored  Breasts: No breast masses, tenderness, asymmetry, or nipple discharge, breast reduction scars  Heart: Regular rate and rhythm, S1 and S2 normal, no murmur, rub, or gallop, Abdomen: Soft, non-tender, bowel sounds active all four quadrants,  no masses, no organomegaly  Pelvic: Normally developed genitalia with no external lesions or eruptions. Vagina and cervix show no lesions, inflammation, discharge or tenderness. Uterus normal to palpation.  No adnexal mass or tenderness.  Extremities: Extremities normal, atraumatic, no cyanosis or edema  Skin: Skin color, texture, turgor normal, no rashes or lesions  Lymph nodes: Cervical, supraclavicular, and axillary nodes normal  Neurologic: Normal            Results for orders placed or performed in visit on 02/10/21   HM2(CBC w/o Differential)   Result Value Ref Range    WBC 4.3 4.0 - 11.0 thou/uL    RBC 4.56 3.80 - 5.40 mill/uL    Hemoglobin 13.8 12.0 - 16.0 g/dL    Hematocrit 42.0 35.0 - 47.0 %    MCV 92 80 - 100 fL    MCH 30.3 27.0 - 34.0 pg    MCHC 32.9 32.0 - 36.0 g/dL    RDW 11.9 11.0 - 14.5 %    Platelets 287 140 - 440 thou/uL    MPV 8.7 7.0 - 10.0 fL       Leah Shetty MD    This note has been dictated using voice recognition software. Any grammatical or context distortions are unintentional and inherent to the software

## 2021-06-16 NOTE — TELEPHONE ENCOUNTER
ALAN    Received note from [facility].     Wixela 250/50 is $241 with her insurance.  Too expensive. Insurance did not give any alternatives.  Pt is going to call insurance herself and let us know how she would like to proceed.

## 2021-06-19 NOTE — LETTER
Letter by Justus Marroquin MD at      Author: Justus Marroquin MD Service: -- Author Type: --    Filed:  Encounter Date: 11/15/2019 Status: Signed        [facility] PATIENT ACCESS  [address] [place] [address] 1  [facility] [address]  [phone]         Dyan Lockett  [address] [place] [facility] [address]        11/15/19    Dear Dyan Lockett,     At [facility] we care about your health and well-being. Your primary care provider is committed to ensuring you receive high quality care and has chosen a network of specialists to assist in providing that care. Recently Dr. Marroquin referred you to Radiology for specialty care: Mammo.      Please call [facility] at [phone] at your earliest convenience for assistance in scheduling an appointment.  If you have already scheduled this appointment, please disregard this notice.  Thank you for choosing [facility] for your healthcare needs.       Sincerely,       [facility] Specialty Scheduling

## 2021-06-19 NOTE — LETTER
Letter by Leah Shetty MD at      Author: Leah Shetty MD Service: -- Author Type: --    Filed:  Encounter Date: 10/15/2019 Status: Signed       Dyan Lockett  [address]  [facility] [address]    October 15, 2019    Dear Dyan    In reviewing your records, we have determined a gap in your preventive services. Based on your age and health history, we recommend the follow service.     ? General Physical  ? Physical with a Pap Smear  ? Colon cancer screening  ? Mammogram  ? Immunization  ? Diabetic check  ? Blood pressure/cardiovascular check  ? Asthma check  ? Cholesterol test  ? Lab work  ? Med check    If you have had the service elsewhere, please contact us so we can update our records. Please let us know if you have transferred your care to another clinic.    Please call [phone] to schedule this appointment.    We believe that a strong preventive care program, including regular physicals and follow-up care is an important part of a healthy lifestyle and we are committed to helping you maintain your health.    Thank you for choosing us as your health care provider.    Sincerely,   [facility]  [address] [facility] [address]  Phone Number:  [phone]

## 2021-06-20 NOTE — PROGRESS NOTES
Preoperative Exam    Scheduled Procedure: Left foot   Surgery Date:  9/26/18  Surgery Location: [facility], fax [phone]    Surgeon:  Dr. KARI Sunshine    Assessment/Plan:     1. Preop general physical exam  Patient is approved for surgery with general anesthesia.  We will update her tetanus today.  CBC and pregnancy test are sent.  - HM2(CBC w/o Differential)  - Tdap vaccine greater than or equal to 6yo IM  - Pregnancy (Beta-hCG, Qual), Urine    2. Visit for screening mammogram  Patient is overdue for mammogram, so card was given.  - Mammo Screening Bilateral; Future    3. Hypothyroidism  Patient is overdue for thyroid labs.  Refills will be sent once the results come back.  - Thyroid Stimulating Hormone (TSH)     Surgical Procedure Risk: Low (reported cardiac risk generally < 1%)  Have you had prior anesthesia?: Yes  Have you or any family members had a previous anesthesia reaction:  No  Do you or any family members have a history of a clotting or bleeding disorder?: No  Cardiac Risk Assessment: no increased risk for major cardiac complications    Patient approved for surgery with general or local anesthesia.    Functional Status: Independent  Patient plans to recover at home with family.     Subjective:      Dyan Lockett is a 48 y.o. female who presents for a preoperative consultation.  She has been having some pain in her lateral and plantar aspects of her feet bilaterally.  She has been evaluated by an orthopedist and found to have tailor's bunion bilaterally.  She plans on doing the left foot first, and then following up for the right foot.  She has had multiple surgeries in the past without any issues with anesthesia.  She has no known coronary artery disease and denies chest pain or shortness of breath.  She has no limits to her activities.  She has hypothyroidism and is overdue for blood work today.  This is been fairly stable in the past.  She is due for a  tetanus shot and we will update that today.  She is also due for mammogram and recommend she follow-up with that at her convenience.  She is a never smoker.  She has had no new recent illnesses or exposures.  CBC and UPT are sent.  No indication for EKG.    All other systems reviewed and are negative, other than those listed in the HPI.    Pertinent History  Do you have difficulty breathing or chest pain after walking up a flight of stairs: No  History of obstructive sleep apnea: No  Steroid use in the last 6 months: No  Frequent Aspirin/NSAID use: No  Prior Blood Transfusion: No  Prior Blood Transfusion Reaction: No  If for some reason prior to, during or after the procedure, if it is medically indicated, would you be willing to have a blood transfusion?:  There is no transfusion refusal.    Current Outpatient Prescriptions   Medication Sig Dispense Refill     levothyroxine (SYNTHROID, LEVOTHROID) 112 MCG tablet TAKE 1 TABLET BY MOUTH EVERY DAY - DUE TO BE SEEN 90 tablet 3     No current facility-administered medications for this visit.         No Known Allergies    Patient Active Problem List   Diagnosis     Hypothyroidism     Allergies       No past medical history on file.    Past Surgical History:   Procedure Laterality Date     AUGMENTATION MAMMAPLASTY  2000     [place] CORRJ HALLUX VALGUS W/SESMDC W/DIST METAR OSTEOT      Description: Bunion Correction With Metatarsal Osteotomy;  Recorded: 10/09/2008;  Comments: and tendon release     WV CORRJ HALLUX VALGUS W/SESMDC W/RESCJ PROX PHAL      Description: Bunion Correction By Cheilectomy;  Recorded: 10/09/2008;  Comments: right x3     WV ENLARGE BREAST      Description: Breast Surgery Enlargement Procedure Bilateral;  Recorded: 10/09/2008;     WV LIGATE FALLOPIAN TUBE      Description: Tubal Ligation;  Recorded: 10/09/2008;     WV XFER SINGLE SUPERFICI LOW LEG TENDON      Description: Foot Repair Tendon Transfer Superficial;  Recorded: 10/09/2008;  Comments: right  "      Social History     Social History     Marital status:      Spouse name: N/A     Number of children: N/A     Years of education: N/A     Occupational History     Not on file.     Social History Main Topics     Smoking status: Never Smoker     Smokeless tobacco: Never Used     Alcohol use Not on file     Drug use: Not on file     Sexual activity: Not on file     Other Topics Concern     Not on file     Social History Narrative         Objective:     Vitals:    09/17/18 0825   BP: 100/54   Pulse: 80   Resp: 16   Temp: 98.1  F (36.7  C)   TempSrc: Oral   Weight: 147 lb (66.7 kg)   Height: 5' 1\" (1.549 m)   LMP: 05/01/2018         Physical Exam:    Physical Exam:  General Appearance: Alert, cooperative, no distress, appears stated age  Head: Normocephalic, without obvious abnormality, atraumatic  Eyes: PERRL, conjunctiva/corneas clear, EOM's intact  Ears: Normal TM's and external ear canals, both ears  Nose: Nares normal, septum midline,mucosa normal, no drainage  Throat: Lips, mucosa, and tongue normal; teeth and gums normal  Neck: Supple, symmetrical, trachea midline, no adenopathy; thyroid: not enlarged, symmetric, no tenderness/mass/nodules; no carotid bruit  Back: Symmetric, no curvature, ROM normal, no CVA tenderness  Lungs: Clear to auscultation bilaterally, respirations unlabored  Heart: Regular rate and rhythm, S1 and S2 normal, no murmur, rub, or gallop, Abdomen: Soft, non-tender, bowel sounds active all four quadrants,  no masses, no organomegaly  Extremities: Extremities normal, atraumatic, no cyanosis or edema  Skin: Skin color, texture, turgor normal, no rashes or lesions  Lymph nodes: Cervical, supraclavicular, and axillary nodes normal  Neurologic: Normal          Patient Instructions     Hold all supplements, aspirin and NSAIDs for 7 days prior to surgery.  Follow your surgeon's direction on when to stop eating and drinking prior to surgery.  Your surgeon will be managing your pain after " your surgery.        Labs:  Recent Results (from the past 24 hour(s))   HM2(CBC w/o Differential)    Collection Time: 09/17/18  8:50 AM   Result Value Ref Range    WBC 4.2 4.0 - 11.0 thou/uL    RBC 4.25 3.80 - 5.40 mill/uL    Hemoglobin 13.0 12.0 - 16.0 g/dL    Hematocrit 38.8 35.0 - 47.0 %    MCV 91 80 - 100 fL    MCH 30.5 27.0 - 34.0 pg    MCHC 33.4 32.0 - 36.0 g/dL    RDW 11.6 11.0 - 14.5 %    Platelets 271 140 - 440 thou/uL    MPV 7.0 7.0 - 10.0 fL   Pregnancy (Beta-hCG, Qual), Urine    Collection Time: 09/17/18  8:50 AM   Result Value Ref Range    Pregnancy Test, Urine Negative Negative    Specific Gravity, UA 1.015 1.001 - 1.030       Immunization History   Administered Date(s) Administered     DT (pediatric) 01/01/1999, 02/01/1999     Hep A, historic 01/13/2005, 11/03/2011     Hep B, historic 03/04/2014     Td,adult,historic,unspecified 07/10/2008     Tdap 07/10/2008, 09/17/2018           Electronically signed by Leah Shetty MD 09/17/18 8:29 AM

## 2021-06-22 NOTE — PROGRESS NOTES
Preoperative Exam    Scheduled Procedure: Right foot  Surgery Date:  12/27/18  Surgery Location: [facility]    Surgeon:  Dr. Sunshine    Assessment/Plan:     1. Preop examination  Patient is approved for surgery with general anesthesia.  Her only medication is Synthroid and she will continue to take that.  Was instructed to stop all supplements 1 week prior to procedure.  Hemoglobin is normal and pregnancy test is negative.  - Hemoglobin  - Pregnancy (Beta-hCG, Qual), Urine    2. Tailor's bunion of right foot      3. Hypothyroidism due to Hashimoto's thyroiditis  Stable on current medications.        Surgical Procedure Risk: Low (reported cardiac risk generally < 1%)  Have you had prior anesthesia?: Yes  Have you or any family members had a previous anesthesia reaction:  No  Do you or any family members have a history of a clotting or bleeding disorder?: No  Cardiac Risk Assessment: no increased risk for major cardiac complications    Patient approved for surgery with general or local anesthesia.      Functional Status: Independent  Patient plans to recover at home with family.     Subjective:      Dyan Lockett is a 48 y.o. female who presents for a preoperative consultation.  She returns today as she is now going to have the other foot done.  She had the left side done in September and had a successful outcome.  They are now planning on doing foot.  She has had several surgeries in the past without any issues with anesthesia.  She has no known coronary artery disease and denies chest pain or shortness of breath.  She has no limitations to her activities.  She is a never smoker.  She has hypothyroidism and is stable on her current dose of Synthroid.  She is up-to-date on immunizations.  She said no recent illnesses or exposures.    All other systems reviewed and are negative, other than those listed in the HPI.    Pertinent History  Do you have difficulty breathing or chest pain after walking up a flight  of stairs: No  History of obstructive sleep apnea: No  Steroid use in the last 6 months: No  Frequent Aspirin/NSAID use: No  Prior Blood Transfusion: No  Prior Blood Transfusion Reaction: No  If for some reason prior to, during or after the procedure, if it is medically indicated, would you be willing to have a blood transfusion?:  There is no transfusion refusal.    Current Outpatient Medications   Medication Sig Dispense Refill     levothyroxine (SYNTHROID, LEVOTHROID) 112 MCG tablet TAKE 1 TABLET BY MOUTH EVERY DAY - DUE TO BE SEEN 90 tablet 3     No current facility-administered medications for this visit.         No Known Allergies    Patient Active Problem List   Diagnosis     Hypothyroidism     Allergies       No past medical history on file.    Past Surgical History:   Procedure Laterality Date     AUGMENTATION MAMMAPLASTY  2000     AZ CORRJ HALLUX VALGUS W/SESMDC W/DIST METAR OSTEOT      Description: Bunion Correction With Metatarsal Osteotomy;  Recorded: 10/09/2008;  Comments: and tendon release     AZ CORRJ HALLUX VALGUS W/SESMDC W/RESCJ PROX PHAL      Description: Bunion Correction By Cheilectomy;  Recorded: 10/09/2008;  Comments: right x3     AZ ENLARGE BREAST      Description: Breast Surgery Enlargement Procedure Bilateral;  Recorded: 10/09/2008;     AZ LIGATE FALLOPIAN TUBE      Description: Tubal Ligation;  Recorded: 10/09/2008;     AZ XFER SINGLE SUPERFICI LOW LEG TENDON      Description: Foot Repair Tendon Transfer Superficial;  Recorded: 10/09/2008;  Comments: right       Social History     Socioeconomic History     Marital status:      Spouse name: Not on file     Number of children: Not on file     Years of education: Not on file     Highest education level: Not on file   Social Needs     Financial resource strain: Not on file     Food insecurity - worry: Not on file     Food insecurity - inability: Not on file     Transportation needs - medical: Not on file     Transportation needs -  "non-medical: Not on file   Occupational History     Not on file   Tobacco Use     Smoking status: Never Smoker     Smokeless tobacco: Never Used   Substance and Sexual Activity     Alcohol use: Not on file     Drug use: Not on file     Sexual activity: Not on file   Other Topics Concern     Not on file   Social History Narrative     Not on file         Objective:     Vitals:    12/21/18 0836   BP: 114/62   Pulse: 80   Resp: 16   Temp: 97.6  F (36.4  C)   Weight: 150 lb (68 kg)   Height: 5' 1\" (1.549 m)         Physical Exam:  Physical Exam    Physical Exam:  General Appearance: Alert, cooperative, no distress, appears stated age  Head: Normocephalic, without obvious abnormality, atraumatic  Eyes: PERRL, conjunctiva/corneas clear, EOM's intact  Ears: Normal TM's and external ear canals, both ears  Nose: Nares normal, septum midline,mucosa normal, no drainage  Throat: Lips, mucosa, and tongue normal; teeth and gums normal  Neck: Supple, symmetrical, trachea midline, no adenopathy; thyroid: not enlarged, symmetric, no tenderness/mass/nodules; no carotid bruit  Back: Symmetric, no curvature, ROM normal, no CVA tenderness  Lungs: Clear to auscultation bilaterally, respirations unlabored  Heart: Regular rate and rhythm, S1 and S2 normal, no murmur, rub, or gallop,   Abdomen: Soft, non-tender, bowel sounds active all four quadrants,  no masses, no organomegaly  Extremities: Extremities normal, atraumatic, no cyanosis or edema  Skin: Skin color, texture, turgor normal, no rashes or lesions  Lymph nodes: Cervical, supraclavicular, and axillary nodes normal  Neurologic: Normal        Patient Instructions     Hold all supplements, aspirin and NSAIDs for 7 days prior to surgery.  Follow your surgeon's direction on when to stop eating and drinking prior to surgery.  Your surgeon will be managing your pain after your surgery.        Labs:  Recent Results (from the past 24 hour(s))   Hemoglobin    Collection Time: 12/21/18  8:50 " AM   Result Value Ref Range    Hemoglobin 13.7 12.0 - 16.0 g/dL   Pregnancy (Beta-hCG, Qual), Urine    Collection Time: 12/21/18  8:50 AM   Result Value Ref Range    Pregnancy Test, Urine Negative Negative    Specific Gravity, UA 1.020 1.001 - 1.030       Immunization History   Administered Date(s) Administered     DT (pediatric) 01/01/1999, 02/01/1999     Hep A, historic 01/13/2005, 11/03/2011     Hep B, historic 03/04/2014     Influenza, Seasonal, Inj PF IIV3 10/10/2013, 10/04/2016     Influenza,seasonal quad, PF, 36+MOS 10/03/2017     Influenza,seasonal, Inj IIV3 09/24/2015     Td,adult,historic,unspecified 07/10/2008     Tdap 07/10/2008, 09/17/2018           Electronically signed by Leah Shetty MD 12/21/18 8:36 AM

## 2021-07-03 ENCOUNTER — HEALTH MAINTENANCE LETTER (OUTPATIENT)
Age: 51
End: 2021-07-03

## 2021-07-21 ENCOUNTER — RECORDS - HEALTHEAST (OUTPATIENT)
Dept: ADMINISTRATIVE | Facility: CLINIC | Age: 51
End: 2021-07-21

## 2021-10-17 ENCOUNTER — APPOINTMENT (OUTPATIENT)
Dept: RADIOLOGY | Facility: HOSPITAL | Age: 51
End: 2021-10-17
Attending: EMERGENCY MEDICINE
Payer: COMMERCIAL

## 2021-10-17 ENCOUNTER — HOSPITAL ENCOUNTER (EMERGENCY)
Facility: HOSPITAL | Age: 51
Discharge: HOME | End: 2021-10-17
Attending: EMERGENCY MEDICINE | Admitting: EMERGENCY MEDICINE
Payer: COMMERCIAL

## 2021-10-17 ENCOUNTER — OFFICE VISIT (OUTPATIENT)
Dept: FAMILY MEDICINE | Facility: CLINIC | Age: 51
End: 2021-10-17
Payer: COMMERCIAL

## 2021-10-17 VITALS
DIASTOLIC BLOOD PRESSURE: 65 MMHG | HEART RATE: 81 BPM | HEIGHT: 61 IN | OXYGEN SATURATION: 96 % | BODY MASS INDEX: 27.38 KG/M2 | WEIGHT: 145 LBS | RESPIRATION RATE: 18 BRPM | TEMPERATURE: 98.5 F | SYSTOLIC BLOOD PRESSURE: 105 MMHG

## 2021-10-17 VITALS
HEART RATE: 90 BPM | DIASTOLIC BLOOD PRESSURE: 59 MMHG | TEMPERATURE: 98.5 F | SYSTOLIC BLOOD PRESSURE: 89 MMHG | WEIGHT: 145.3 LBS | OXYGEN SATURATION: 90 % | RESPIRATION RATE: 16 BRPM | BODY MASS INDEX: 27.45 KG/M2

## 2021-10-17 DIAGNOSIS — U07.1 INFECTION DUE TO 2019 NOVEL CORONAVIRUS: ICD-10-CM

## 2021-10-17 DIAGNOSIS — E87.6 HYPOKALEMIA: ICD-10-CM

## 2021-10-17 DIAGNOSIS — R06.02 SOB (SHORTNESS OF BREATH): Primary | ICD-10-CM

## 2021-10-17 LAB
ANION GAP SERPL CALCULATED.3IONS-SCNC: 9 MMOL/L (ref 5–18)
BNP SERPL-MCNC: 10 PG/ML (ref 0–76)
BUN SERPL-MCNC: 7 MG/DL (ref 8–22)
CALCIUM SERPL-MCNC: 8.5 MG/DL (ref 8.5–10.5)
CHLORIDE BLD-SCNC: 106 MMOL/L (ref 98–107)
CO2 SERPL-SCNC: 28 MMOL/L (ref 22–31)
CREAT SERPL-MCNC: 0.68 MG/DL (ref 0.6–1.1)
D DIMER PPP FEU-MCNC: 0.59 UG/ML FEU (ref 0–0.5)
ERYTHROCYTE [DISTWIDTH] IN BLOOD BY AUTOMATED COUNT: 13 % (ref 10–15)
GFR SERPL CREATININE-BSD FRML MDRD: >90 ML/MIN/1.73M2
GLUCOSE BLD-MCNC: 100 MG/DL (ref 70–125)
HCT VFR BLD AUTO: 41.6 % (ref 35–47)
HGB BLD-MCNC: 13.4 G/DL (ref 11.7–15.7)
MCH RBC QN AUTO: 30.1 PG (ref 26.5–33)
MCHC RBC AUTO-ENTMCNC: 32.2 G/DL (ref 31.5–36.5)
MCV RBC AUTO: 94 FL (ref 78–100)
PLATELET # BLD AUTO: 190 10E3/UL (ref 150–450)
POTASSIUM BLD-SCNC: 3.1 MMOL/L (ref 3.5–5)
RBC # BLD AUTO: 4.45 10E6/UL (ref 3.8–5.2)
SARS-COV-2 RNA RESP QL NAA+PROBE: POSITIVE
SODIUM SERPL-SCNC: 143 MMOL/L (ref 136–145)
TROPONIN I SERPL-MCNC: 0.02 NG/ML (ref 0–0.29)
WBC # BLD AUTO: 2.3 10E3/UL (ref 4–11)

## 2021-10-17 PROCEDURE — C9803 HOPD COVID-19 SPEC COLLECT: HCPCS

## 2021-10-17 PROCEDURE — 99284 EMERGENCY DEPT VISIT MOD MDM: CPT

## 2021-10-17 PROCEDURE — 82374 ASSAY BLOOD CARBON DIOXIDE: CPT | Performed by: EMERGENCY MEDICINE

## 2021-10-17 PROCEDURE — 93005 ELECTROCARDIOGRAM TRACING: CPT | Performed by: EMERGENCY MEDICINE

## 2021-10-17 PROCEDURE — 84484 ASSAY OF TROPONIN QUANT: CPT | Performed by: EMERGENCY MEDICINE

## 2021-10-17 PROCEDURE — 87635 SARS-COV-2 COVID-19 AMP PRB: CPT | Performed by: EMERGENCY MEDICINE

## 2021-10-17 PROCEDURE — 85027 COMPLETE CBC AUTOMATED: CPT | Performed by: EMERGENCY MEDICINE

## 2021-10-17 PROCEDURE — 99207 PR NON-BILLABLE SERV PER CHARTING: CPT | Performed by: FAMILY MEDICINE

## 2021-10-17 PROCEDURE — 36415 COLL VENOUS BLD VENIPUNCTURE: CPT | Performed by: EMERGENCY MEDICINE

## 2021-10-17 PROCEDURE — 83880 ASSAY OF NATRIURETIC PEPTIDE: CPT | Performed by: EMERGENCY MEDICINE

## 2021-10-17 PROCEDURE — 71045 X-RAY EXAM CHEST 1 VIEW: CPT

## 2021-10-17 PROCEDURE — 250N000013 HC RX MED GY IP 250 OP 250 PS 637: Performed by: EMERGENCY MEDICINE

## 2021-10-17 PROCEDURE — 85379 FIBRIN DEGRADATION QUANT: CPT | Performed by: EMERGENCY MEDICINE

## 2021-10-17 RX ORDER — POTASSIUM CHLORIDE 1500 MG/1
40 TABLET, EXTENDED RELEASE ORAL ONCE
Status: COMPLETED | OUTPATIENT
Start: 2021-10-17 | End: 2021-10-17

## 2021-10-17 RX ADMIN — POTASSIUM CHLORIDE 40 MEQ: 20 TABLET, EXTENDED RELEASE ORAL at 12:56

## 2021-10-17 ASSESSMENT — MIFFLIN-ST. JEOR: SCORE: 1205.1

## 2021-10-17 NOTE — LETTER
Dyan Lockett was seen and treated in our emergency department on 10/17/2021.  She may return to work on 10/25/2021.       If you have any questions or concerns, please don't hesitate to call.      Kayla Martinez MD

## 2021-10-17 NOTE — DISCHARGE INSTRUCTIONS
[email or web address]    Above is the website link from the [facility] of Health for monoclonal antibodies.  You can go on to this website, read more, and click on the link on the bottom to see whether you are a candidate for monoclonal antibody treatment.  If so it gives you location's for treatment options.    Multivitamin, zinc, baby aspirin all have a trend towards improvement.  Most important is to make sure that you are drinking plenty of fluids, getting plenty of rest and spend as much time sleeping/laying on your abdomen which will help the shortness of breath and the cough.  Purchase a pulse oximeter.  Return to the emergency department if your oxygen level is ever below 90.

## 2021-10-17 NOTE — PROGRESS NOTES
Cough x cold x 2 weks.  Increased SOB.  Nonsmoker.  Not vaccinated.  Here with  with similar sx.  O2 sats 89-90%  Sent to [facility].  Report called.    Chica Parnell MD  [facility]

## 2021-10-17 NOTE — ED PROVIDER NOTES
EMERGENCY DEPARTMENT ENCOUNTER      NAME: Dyan Lockett  AGE: 51 year old female  YOB: 1970  MRN: [id number]  EVALUATION DATE & TIME: 10/17/2021 11:31 AM    PCP: Leah Shetty    ED PROVIDER: Kayla Martinez MD    Chief Complaint   Patient presents with     Cough         FINAL IMPRESSION:  1. Infection due to 2019 novel coronavirus    2. Hypokalemia          ED COURSE & MEDICAL DECISION MAKING:    Pertinent Labs & Imaging studies reviewed. (See chart for details)  51 year old female with history of hypothyroidism not Covid vaccinated who presents to the Emergency Department for evaluation of 2 weeks of flulike symptoms acutely worsening 3 days ago with worsening shortness of breath and cough.  Oxygen saturations 90% at walk-in clinic and so sent here.  Here however patient's O2 saturations are in the mid 90s.  With ambulatory trial she does not desat anything below 95%.  Differential includes viral syndrome, Covid, influenza, pneumonia.  Concern for symptomatic anemia, arrhythmia, ACS less likely.  Patient does not have any chest pain, pleuritic component, leg pain calf swelling exogenous estrogen use.  My suspicion for PE is quite low.    Patient placed on monitor, IV established and blood obtained.  Twelve-lead EKG shows sinus rhythm.  Covid swab was obtained and positive.  Chest x-ray unremarkable.   CBC, BMP, BNP, troponin, D-dimer notable for leukopenia with WBC of 2.3.  This is commonly seen with Covid.  D-dimer minimally elevated 0.59.  Given her known Covid infection I do not think she warrants CT PE study.  Discussed with patient and she is comfortable with discharged home.        ED Course as of Oct 17 1317   Sun Oct 17, 2021   1157 Low BP baseline for pt reviewing previous BP on file   BP: 90/60   1213 WBC(!): 2.3   1219 SARS CoV2 PCR(!): Positive   1232 Potassium(!): 3.1   1237 Updated on plan of care           11:43 AM I met the patient and performed my initial interview and exam. I  saw the patient in PPE including a face shield, N95 mask, goggles, and gloves.   11:58 AM I reviewed the patient's EKG.  12:19 PM Patient is COVID positive.  12:37 PM I rechecked and updated the patient. Discussed plans for discharge and patient was agreeable.     At the conclusion of the encounter I discussed the results of all of the tests and the disposition. The questions were answered. The patient or family acknowledged understanding and was agreeable with the care plan.      MEDICATIONS GIVEN IN THE EMERGENCY:  Medications   potassium chloride ER (KLOR-CON M) CR tablet 40 mEq (40 mEq Oral Given 10/17/21 1256)       NEW PRESCRIPTIONS STARTED AT TODAY'S ER VISIT  New Prescriptions    No medications on file          =================================================================    HPI    Patient information was obtained from: Patient     Use of Intrepreter: N/A        Dyan Lockett is a 51 year old female with pertinent medical history of hypothyroidism who presents to the ED via walk in for evaluation of cold symptoms.    Patient reports she has been sick with mild cold/flu symptoms for the past two weeks along with her . Her symptoms include fatigue and a nonproductive cough. On 10/14 (3 days ago), the patient reports her cough became worse and more frequent. Of note, the patient was sent to the ED from a walk-in clinic as her O2 saturation was 89-90%. She has been using over the counter cough and cold medication. Patient is not vaccinated against COVID and denies any known sick contacts. She denies history of blood clots and bleeding disorders. History of hypothyroidism that she takes synthroid for. Denies fevers, rhinorrhea, shortness of breath, diarrhea, nausea, vomiting, calf swelling, leg pain, or any other complaints at this time.      REVIEW OF SYSTEMS  Constitutional:  Denies fever, chills, weight loss or weakness. Positive for fatigue  HENT:  Denies sore throat, ear pain, congestion,  rhinorrhea  Respiratory: No SOB, wheeze. Positive for cough (nonproductive)  Cardiovascular:  No CP, palpitations, leg swelling  GI:  Denies abdominal pain, nausea, vomiting, diarrhea  Musculoskeletal:  Denies any new muscle/joint pain, swelling or loss of function.    All other systems negative unless noted in HPI.      PAST MEDICAL HISTORY:  Past Medical History:   Diagnosis Date     Hypothyroid        PAST SURGICAL HISTORY:  Past Surgical History:   Procedure Laterality Date     C LIGATE FALLOPIAN TUBE      Description: Tubal Ligation;  Recorded: 10/09/2008;     C XFER SINGLE SUPERFICI LOW LEG TENDON      Description: Foot Repair Tendon Transfer Superficial;  Recorded: 10/09/2008;  Comments: right     HC CORRECT BUNION,AGUAYO/ROJAS/PALMER      Description: Bunion Correction By Cheilectomy;  Recorded: 10/09/2008;  Comments: right x3     HC CORRECT BUNION,METATARSAL OSTEOTOMY      Description: Bunion Correction With Metatarsal Osteotomy;  Recorded: 10/09/2008;  Comments: and tendon release     MAMMOPLASTY AUGMENTATION  2000    Has had them removed in Sept 2019     [facility] [place] ENLARGE BREAST      Description: Breast Surgery Enlargement Procedure Bilateral;  Recorded: 10/09/2008;       CURRENT MEDICATIONS:    Prior to Admission Medications   Prescriptions Last Dose Informant Patient Reported? Taking?   FA/mv,Ca,iron,min/lycopene/lut (MULTIVITAL ORAL)   Yes No   Sig: [FA/MV,CA,IRON,MIN/LYCOPENE/LUT (MULTIVITAL ORAL)] Take by mouth.   fluticasone propion-salmeteroL (ADVAIR) 250-50 mcg/dose DISKUS   No No   Sig: [FLUTICASONE PROPION-SALMETEROL (ADVAIR) 250-50 MCG/DOSE DISKUS] Inhale 1 puff 2 (two) times a day.   Patient not taking: Reported on 10/17/2021   fluticasone propionate (FLONASE) 50 mcg/actuation nasal spray   No No   Sig: [FLUTICASONE PROPIONATE (FLONASE) 50 MCG/ACTUATION NASAL SPRAY] 2 sprays into each nostril daily.   levothyroxine (SYNTHROID, LEVOTHROID) 112 MCG tablet   No No   Sig: [LEVOTHYROXINE (SYNTHROID,  "LEVOTHROID) 112 MCG TABLET] TAKE ONE TABLET BY MOUTH ONCE DAILY   predniSONE (DELTASONE) 10 mg tablet   No No   Sig: [PREDNISONE (DELTASONE) 10 MG TABLET] Take 40mg by mouth daily for 3 days, then 30mg x 3 days, then 20mg x 3 days ,then 10mg x 3 days then stop.   Patient not taking: Reported on 10/17/2021      Facility-Administered Medications: None       ALLERGIES:  No Known Allergies    FAMILY HISTORY:  Family History   Problem Relation Age of Onset     No Known Problems Mother      No Known Problems Father        SOCIAL HISTORY:  Social History     Tobacco Use     Smoking status: Never Smoker     Smokeless tobacco: Never Used   Substance Use Topics     Alcohol use: Yes     Alcohol/week: 2.0 standard drinks     Drug use: None        VITALS:  Patient Vitals for the past 24 hrs:   BP Temp Temp src Pulse Resp SpO2 Height Weight   10/17/21 1151 105/65 -- -- 81 -- 95 % -- --   10/17/21 1129 90/60 98.5  F (36.9  C) Tympanic 85 12 91 % 1.549 m (5' 1\") 65.8 kg (145 lb)       PHYSICAL EXAM    General Appearance: Well-appearing, well-nourished, no acute distress   Head:  Normocephalic  Eyes:  conjunctiva/corneas clear  ENT:   membranes are moist without pallor  Neck:  Supple  Cardio:  Regular rate and rhythm, no murmur/gallop/rub, 2+ pulses symmetric in all extremities  Pulm:  No respiratory distress, clear to auscultation bilaterally. 95% O2 sats on RA. Ambulatory trial for one minute without dyspnea. Lungs clear.   Back:  No CVA tenderness, normal ROM  Abdomen:  Soft, non-tender  Extremities:  Extremities normal, atraumatic, no cyanosis, full ROM and motor tone intact, bilateral pulses intact upper and lower. No peripheral edema.   Skin:  Skin warm, dry, no rashes  Neuro:  Alert and oriented ×3, moving all extremities, no gross sensory defects     RADIOLOGY/LABS:  Reviewed all pertinent imaging. Please see official radiology report. All pertinent labs reviewed and interpreted.    Results for orders placed or performed " during the hospital encounter of 10/17/21   XR Chest Port 1 View    Impression    IMPRESSION: Lungs are clear. No pleural effusion. Heart size and pulmonary vascularity within normal limits.   Symptomatic COVID-19 Virus (Coronavirus) by PCR Nasopharyngeal    Specimen: Nasopharyngeal; Swab   Result Value Ref Range    SARS CoV2 PCR Positive (A) Negative   CBC (+ platelets, no diff)   Result Value Ref Range    WBC Count 2.3 (L) 4.0 - 11.0 10e3/uL    RBC Count 4.45 3.80 - 5.20 10e6/uL    Hemoglobin 13.4 11.7 - 15.7 g/dL    Hematocrit 41.6 35.0 - 47.0 %    MCV 94 78 - 100 fL    MCH 30.1 26.5 - 33.0 pg    MCHC 32.2 31.5 - 36.5 g/dL    RDW 13.0 10.0 - 15.0 %    Platelet Count 190 150 - 450 10e3/uL   Basic metabolic panel   Result Value Ref Range    Sodium 143 136 - 145 mmol/L    Potassium 3.1 (L) 3.5 - 5.0 mmol/L    Chloride 106 98 - 107 mmol/L    Carbon Dioxide (CO2) 28 22 - 31 mmol/L    Anion Gap 9 5 - 18 mmol/L    Urea Nitrogen 7 (L) 8 - 22 mg/dL    Creatinine 0.68 0.60 - 1.10 mg/dL    Calcium 8.5 8.5 - 10.5 mg/dL    Glucose 100 70 - 125 mg/dL    GFR Estimate >90 >60 mL/min/1.73m2   B-Type Natriuretic Peptide ([facility] Only)   Result Value Ref Range    BNP 10 0 - 76 pg/mL   Troponin I (now)   Result Value Ref Range    Troponin I 0.02 0.00 - 0.29 ng/mL   D dimer quantitative   Result Value Ref Range    D-Dimer Quantitative 0.59 (H) 0.00 - 0.50 ug/mL FEU       EKG:  Performed at: 11:52    Impression: Sinus rhythm. Normal ECG.     Rate: 77 BPM  Rhythm: Sinus rhythm   Axis: 67  NJ Interval: 116 ms  QRS Interval: 80 ms  QTc Interval: 463 ms  ST Changes: No ST changes   Comparison: When compared with ECG of 09/06/2019, no significant change was found.   I have independently reviewed and interpreted the EKG(s) documented above.    The creation of this record is based on the scribe s observations of the work being performed by Kayla Martinez MD and the provider s statements to them. It was created on his behalf by Nubia  Mary Ann a trained medical scribe. This document has been checked and approved by the attending provider.    Kayla Martinez MD  Emergency Medicine  [facility] EMERGENCY DEPARTMENT  [address] [place] [id number] [phone]  Dept: [phone]     Kayla Martinez MD  10/17/21 8546

## 2021-10-17 NOTE — ED PROVIDER NOTES
Expected Patient Referral to ED  11:14 AM    Referring Clinic/Provider:  [facility]     Reason for referral/Clinical facts:  51 F nonsmoker couple weeks cough/cold. 89-90%.     Recommendations provided:  none    Caller was informed that this institution does  possess the capabilities and/or resources to provide for patient and should be transferred to our institution.    Based on the information provided, discussed that this patient likely is nota good candidate for direct admission to this institution and that provider could proceed as such.  If however direct admit is sought and any hurdles encountered, this ED would be happy to see the patient and evaluate.    Informed caller that recommendations provided are recommendations based only on the facts provided and that they responsible to accept or reject the advice, or to seek a formal in person consultation as needed and that this ED will see/treat patient should they arrive.      Kayla Martinez MD  Emergency Medicine  [facility] EMERGENCY DEPARTMENT  [address] [place] [id number]  [phone]     Kayla Martinez MD  10/17/21 1114

## 2021-10-18 LAB
ATRIAL RATE - MUSE: 77 BPM
DIASTOLIC BLOOD PRESSURE - MUSE: 65 MMHG
INTERPRETATION ECG - MUSE: NORMAL
P AXIS - MUSE: 78 DEGREES
PR INTERVAL - MUSE: 116 MS
QRS DURATION - MUSE: 80 MS
QT - MUSE: 410 MS
QTC - MUSE: 463 MS
R AXIS - MUSE: 67 DEGREES
SYSTOLIC BLOOD PRESSURE - MUSE: 105 MMHG
T AXIS - MUSE: 57 DEGREES
VENTRICULAR RATE- MUSE: 77 BPM

## 2021-10-23 ENCOUNTER — HEALTH MAINTENANCE LETTER (OUTPATIENT)
Age: 51
End: 2021-10-23

## 2022-01-19 ENCOUNTER — OFFICE VISIT (OUTPATIENT)
Dept: FAMILY MEDICINE | Facility: CLINIC | Age: 52
End: 2022-01-19
Payer: COMMERCIAL

## 2022-01-19 VITALS
WEIGHT: 157 LBS | HEART RATE: 75 BPM | RESPIRATION RATE: 16 BRPM | SYSTOLIC BLOOD PRESSURE: 113 MMHG | DIASTOLIC BLOOD PRESSURE: 70 MMHG | BODY MASS INDEX: 29.66 KG/M2 | TEMPERATURE: 97.4 F

## 2022-01-19 DIAGNOSIS — R59.9 ENLARGED LYMPH NODES: Primary | ICD-10-CM

## 2022-01-19 LAB
DEPRECATED S PYO AG THROAT QL EIA: NEGATIVE
GROUP A STREP BY PCR: NOT DETECTED

## 2022-01-19 PROCEDURE — 99213 OFFICE O/P EST LOW 20 MIN: CPT | Performed by: FAMILY MEDICINE

## 2022-01-19 PROCEDURE — 87651 STREP A DNA AMP PROBE: CPT | Performed by: FAMILY MEDICINE

## 2022-01-19 RX ORDER — CHOLECALCIFEROL (VITAMIN D3) 50 MCG
1 TABLET ORAL DAILY
COMMUNITY

## 2022-01-19 NOTE — LETTER
January 20, 2022      Dyan Lockett  [address]  [place] [address]        Dear ,  We are writing to inform you of your test results.    Peter Zaidi:  The final on your strep testing is NEGATIVE.    Resulted Orders   Streptococcus A Rapid Scr w Reflx to PCR - Lab Collect   Result Value Ref Range    Group A Strep antigen Negative Negative   Group A Streptococcus PCR Throat Swab   Result Value Ref Range    Group A strep by PCR Not Detected Not Detected    Narrative    The Xpert Xpress Strep A test, performed on the [employer]  Instrument Systems, is a rapid, qualitative in vitro diagnostic test for the detection of Streptococcus pyogenes (Group A ß-hemolytic Streptococcus, Strep A) in throat swab specimens from patients with signs and symptoms of pharyngitis. The Xpert Xpress Strep A test can be used as an aid in the diagnosis of Group A Streptococcal pharyngitis. The assay is not intended to monitor treatment for Group A Streptococcus infections. The Xpert Xpress Strep A test utilizes an automated real-time polymerase chain reaction (PCR) to detect Streptococcus pyogenes DNA.       If you have any questions or concerns, please call the clinic at the number listed above.       Sincerely,      Terrell Mendez MD

## 2022-01-19 NOTE — PATIENT INSTRUCTIONS
FINAL ON STREP PENDING.    IBUPROFEN 3 TABS UP TO 3 TIMES DAILY (WITH FOOD)  NOT MORE THAN ONE WEEK.    INTERSPERSE DOSES OF TYLENOL 500-1000 UP TO THREE TIMES DAILY     ALTERNATE MOIST HEAT AND ICE TOPICALLY    IF ENLARGED NODE EXCEEDS ONE WEEK THEN CONSIDER STARTING AUGMENTIN.

## 2022-02-15 DIAGNOSIS — E06.3 HYPOTHYROIDISM DUE TO HASHIMOTO'S THYROIDITIS: ICD-10-CM

## 2022-02-16 RX ORDER — LEVOTHYROXINE SODIUM 112 UG/1
TABLET ORAL
Qty: 90 TABLET | Refills: 3 | Status: SHIPPED | OUTPATIENT
Start: 2022-02-16 | End: 2022-04-25

## 2022-04-09 ENCOUNTER — HEALTH MAINTENANCE LETTER (OUTPATIENT)
Age: 52
End: 2022-04-09

## 2022-04-25 ENCOUNTER — OFFICE VISIT (OUTPATIENT)
Dept: FAMILY MEDICINE | Facility: CLINIC | Age: 52
End: 2022-04-25
Payer: COMMERCIAL

## 2022-04-25 VITALS
SYSTOLIC BLOOD PRESSURE: 113 MMHG | RESPIRATION RATE: 16 BRPM | HEIGHT: 61 IN | BODY MASS INDEX: 29.87 KG/M2 | WEIGHT: 158.2 LBS | DIASTOLIC BLOOD PRESSURE: 74 MMHG | HEART RATE: 84 BPM

## 2022-04-25 DIAGNOSIS — Z12.31 VISIT FOR SCREENING MAMMOGRAM: ICD-10-CM

## 2022-04-25 DIAGNOSIS — E06.3 HYPOTHYROIDISM DUE TO HASHIMOTO'S THYROIDITIS: ICD-10-CM

## 2022-04-25 DIAGNOSIS — Z00.00 ROUTINE GENERAL MEDICAL EXAMINATION AT A HEALTH CARE FACILITY: Primary | ICD-10-CM

## 2022-04-25 DIAGNOSIS — E78.00 HYPERCHOLESTEREMIA: ICD-10-CM

## 2022-04-25 LAB
ALBUMIN SERPL-MCNC: 3.9 G/DL (ref 3.5–5)
ALP SERPL-CCNC: 143 U/L (ref 45–120)
ALT SERPL W P-5'-P-CCNC: 53 U/L (ref 0–45)
ANION GAP SERPL CALCULATED.3IONS-SCNC: 12 MMOL/L (ref 5–18)
AST SERPL W P-5'-P-CCNC: 47 U/L (ref 0–40)
BILIRUB SERPL-MCNC: 0.3 MG/DL (ref 0–1)
BUN SERPL-MCNC: 19 MG/DL (ref 8–22)
CALCIUM SERPL-MCNC: 9.8 MG/DL (ref 8.5–10.5)
CHLORIDE BLD-SCNC: 106 MMOL/L (ref 98–107)
CHOLEST SERPL-MCNC: 374 MG/DL
CO2 SERPL-SCNC: 23 MMOL/L (ref 22–31)
CREAT SERPL-MCNC: 0.82 MG/DL (ref 0.6–1.1)
ERYTHROCYTE [DISTWIDTH] IN BLOOD BY AUTOMATED COUNT: 12.7 % (ref 10–15)
FASTING STATUS PATIENT QL REPORTED: YES
GFR SERPL CREATININE-BSD FRML MDRD: 86 ML/MIN/1.73M2
GLUCOSE BLD-MCNC: 99 MG/DL (ref 70–125)
HCT VFR BLD AUTO: 44.3 % (ref 35–47)
HDLC SERPL-MCNC: 80 MG/DL
HGB BLD-MCNC: 14.5 G/DL (ref 11.7–15.7)
LDLC SERPL CALC-MCNC: 271 MG/DL
MCH RBC QN AUTO: 30.3 PG (ref 26.5–33)
MCHC RBC AUTO-ENTMCNC: 32.7 G/DL (ref 31.5–36.5)
MCV RBC AUTO: 93 FL (ref 78–100)
PLATELET # BLD AUTO: 296 10E3/UL (ref 150–450)
POTASSIUM BLD-SCNC: 4.3 MMOL/L (ref 3.5–5)
PROT SERPL-MCNC: 6.8 G/DL (ref 6–8)
RBC # BLD AUTO: 4.78 10E6/UL (ref 3.8–5.2)
SODIUM SERPL-SCNC: 141 MMOL/L (ref 136–145)
T4 FREE SERPL-MCNC: 0.58 NG/DL (ref 0.7–1.8)
TRIGL SERPL-MCNC: 117 MG/DL
TSH SERPL DL<=0.005 MIU/L-ACNC: 55.9 UIU/ML (ref 0.3–5)
WBC # BLD AUTO: 4.6 10E3/UL (ref 4–11)

## 2022-04-25 PROCEDURE — 36415 COLL VENOUS BLD VENIPUNCTURE: CPT | Performed by: FAMILY MEDICINE

## 2022-04-25 PROCEDURE — 85027 COMPLETE CBC AUTOMATED: CPT | Performed by: FAMILY MEDICINE

## 2022-04-25 PROCEDURE — 80061 LIPID PANEL: CPT | Performed by: FAMILY MEDICINE

## 2022-04-25 PROCEDURE — 99396 PREV VISIT EST AGE 40-64: CPT | Performed by: FAMILY MEDICINE

## 2022-04-25 PROCEDURE — 84443 ASSAY THYROID STIM HORMONE: CPT | Performed by: FAMILY MEDICINE

## 2022-04-25 PROCEDURE — 84439 ASSAY OF FREE THYROXINE: CPT | Performed by: FAMILY MEDICINE

## 2022-04-25 PROCEDURE — 80053 COMPREHEN METABOLIC PANEL: CPT | Performed by: FAMILY MEDICINE

## 2022-04-25 PROCEDURE — 99213 OFFICE O/P EST LOW 20 MIN: CPT | Mod: 25 | Performed by: FAMILY MEDICINE

## 2022-04-25 RX ORDER — LEVOTHYROXINE SODIUM 112 UG/1
TABLET ORAL
Qty: 90 TABLET | Refills: 3 | Status: SHIPPED | OUTPATIENT
Start: 2022-04-25 | End: 2022-04-27

## 2022-04-25 NOTE — PROGRESS NOTES
SUBJECTIVE:   CC: Dyan Lockett is an 51 year old woman who presents for preventive health visit.       Patient has been advised of split billing requirements and indicates understanding: Yes  Healthy Habits:     Getting at least 3 servings of Calcium per day:  Yes    Bi-annual eye exam:  Yes    Dental care twice a year:  Yes    Sleep apnea or symptoms of sleep apnea:  None    Diet:  Carbohydrate counting    Frequency of exercise:  2-3 days/week    Duration of exercise:  30-45 minutes    Taking medications regularly:  Yes    Medication side effects:  None    PHQ-2 Total Score: 0    Additional concerns today:  Yes              Today's PHQ-2 Score:   PHQ-2 ( 1999 [employer]) 4/25/2022   Q1: Little interest or pleasure in doing things 0   Q2: Feeling down, depressed or hopeless 0   PHQ-2 Score 0   Q1: Little interest or pleasure in doing things Not at all   Q2: Feeling down, depressed or hopeless Not at all   PHQ-2 Score 0       Abuse: Current or Past (Physical, Sexual or Emotional) - No  Do you feel safe in your environment? Yes    Have you ever done Advance Care Planning? (For example, a Health Directive, POLST, or a discussion with a medical provider or your loved ones about your wishes): No, advance care planning information given to patient to review.  Advanced care planning was discussed at today's visit.    Social History     Tobacco Use     Smoking status: Never Smoker     Smokeless tobacco: Never Used   Substance Use Topics     Alcohol use: Yes     Alcohol/week: 2.0 standard drinks         Alcohol Use 4/25/2022   Prescreen: >3 drinks/day or >7 drinks/week? No       Reviewed orders with patient.  Reviewed health maintenance and updated orders accordingly - Yes  Lab work is in process    Breast Cancer Screening:    Breast CA Risk Assessment (FHS-7) 4/25/2022   Do you have a family history of breast, colon, or ovarian cancer? No / Unknown         Mammogram Screening: Recommended annual mammography  Pertinent  mammograms are reviewed under the imaging tab.    History of abnormal Pap smear: NO - age 30-65 PAP every 5 years with negative HPV co-testing recommended  PAP / HPV Latest Ref Rng & Units 2/10/2021   PAP Negative for squamous intraepithelial lesion or malignancy. Negative for squamous intraepithelial lesion or malignancy  Electronically signed by Sosa Fierro CT (ASCP) on 2/17/2021 at 12:34 PM     HPV16 NEG Negative   HPV18 NEG Negative   HRHPV NEG Negative     Reviewed and updated as needed this visit by clinical staff   Tobacco  Allergies                 Reviewed and updated as needed this visit by Provider                       Review of Systems  CONSTITUTIONAL: NEGATIVE for fever, chills, change in weight  INTEGUMENTARU/SKIN: NEGATIVE for worrisome rashes, moles or lesions  EYES: NEGATIVE for vision changes or irritation  ENT: NEGATIVE for ear, mouth and throat problems  RESP: NEGATIVE for significant cough or SOB  BREAST: NEGATIVE for masses, tenderness or discharge  CV: NEGATIVE for chest pain, palpitations or peripheral edema  GI: NEGATIVE for nausea, abdominal pain, heartburn, or change in bowel habits  : NEGATIVE for unusual urinary or vaginal symptoms. Periods are regular.  MUSCULOSKELETAL: NEGATIVE for significant arthralgias or myalgia  NEURO: NEGATIVE for weakness, dizziness or paresthesias  PSYCHIATRIC: NEGATIVE for changes in mood or affect     OBJECTIVE:   LMP  (LMP Unknown)   Physical Exam  GENERAL APPEARANCE: healthy, alert and no distress  EYES: Eyes grossly normal to inspection, PERRL and conjunctivae and sclerae normal  HENT: ear canals and TM's normal, nose and mouth without ulcers or lesions, oropharynx clear and oral mucous membranes moist  NECK: no adenopathy, no asymmetry, masses, or scars and thyroid normal to palpation  RESP: lungs clear to auscultation - no rales, rhonchi or wheezes  BREAST: normal without masses, tenderness or nipple discharge and no palpable axillary  masses or adenopathy, breast reduction scars  CV: regular rate and rhythm, normal S1 S2, no S3 or S4, no murmur, click or rub, no peripheral edema and peripheral pulses strong  ABDOMEN: soft, nontender, no hepatosplenomegaly, no masses and bowel sounds normal  MS: no musculoskeletal defects are noted and gait is age appropriate without ataxia  SKIN: no suspicious lesions or rashes  NEURO: Normal strength and tone, sensory exam grossly normal, mentation intact and speech normal  PSYCH: mentation appears normal and affect normal/bright    Diagnostic Test Results:  Labs reviewed in Epic  No results found for this or any previous visit (from the past 24 hour(s)).    ASSESSMENT/PLAN:   1. Routine general medical examination at a health care facility  Dyan presents for her annual exam.  She had a normal Pap smear in 2021 so is not due until 2026.  She had a colonoscopy that was normal last years will be due in 2031.  She is due for her mammogram.  She is up-to-date on immunizations.  - CBC with platelets; Future  - Comprehensive metabolic panel; Future  - Lipid panel reflex to direct LDL Fasting; Future  - TSH with free T4 reflex; Future    2. Visit for screening mammogram  - MA SCREENING DIGITAL BILAT - Future  (s+30); Future    3. Hypothyroidism due to Hashimoto's thyroiditis  Her thyroid levels have been fairly stable.  We will recheck today.  - levothyroxine (SYNTHROID/LEVOTHROID) 112 MCG tablet; [LEVOTHYROXINE (SYNTHROID, LEVOTHROID) 112 MCG TABLET] TAKE ONE TABLET BY MOUTH ONCE DAILY  Dispense: 90 tablet; Refill: 3  - TSH with free T4 reflex; Future    4. Hypercholesteremia  Her cholesterol was quite high last year with an LDL of 192.  We will recheck today and discuss options.  She does have some family history of high cholesterol.  - Lipid panel reflex to direct LDL Fasting; Future      Addendum dated 4/27: Cholesterol came back very high.  Recommended starting Lipitor.  Her TSH is also very high so we will  "increase her dose of Synthroid and have her recheck in 12 to 14 weeks.    COUNSELING:  Reviewed preventive health counseling, as reflected in patient instructions       Regular exercise       Healthy diet/nutrition    Estimated body mass index is 29.66 kg/m  as calculated from the following:    Height as of 10/17/21: 1.549 m (5' 1\").    Weight as of 1/19/22: 71.2 kg (157 lb).    Weight management plan: Discussed healthy diet and exercise guidelines    She reports that she has never smoked. She has never used smokeless tobacco.      Counseling Resources:  ATP IV Guidelines  Pooled Cohorts Equation Calculator  Breast Cancer Risk Calculator  BRCA-Related Cancer Risk Assessment: FHS-7 Tool  FRAX Risk Assessment  ICSI Preventive Guidelines  Dietary Guidelines for Americans, 2010  USDA's MyPlate  ASA Prophylaxis  Lung CA Screening    Leah PIERCE [facility]  "

## 2022-04-27 RX ORDER — ATORVASTATIN CALCIUM 20 MG/1
20 TABLET, FILM COATED ORAL DAILY
Qty: 90 TABLET | Refills: 3 | Status: SHIPPED | OUTPATIENT
Start: 2022-04-27 | End: 2024-01-03

## 2022-04-27 RX ORDER — LEVOTHYROXINE SODIUM 125 UG/1
125 TABLET ORAL DAILY
Qty: 90 TABLET | Refills: 3 | Status: SHIPPED | OUTPATIENT
Start: 2022-04-27 | End: 2022-04-28

## 2022-04-28 DIAGNOSIS — E06.3 HYPOTHYROIDISM DUE TO HASHIMOTO'S THYROIDITIS: ICD-10-CM

## 2022-04-28 RX ORDER — LEVOTHYROXINE SODIUM 125 UG/1
125 TABLET ORAL DAILY
Qty: 90 TABLET | Refills: 3 | Status: SHIPPED | OUTPATIENT
Start: 2022-04-28 | End: 2022-08-11

## 2022-04-28 NOTE — TELEPHONE ENCOUNTER
The most recent rx we received indicates two different strengths of levothyroxine. (125mcg and 112mcg). Please verify the correct dose and send new rx. Thank you!

## 2022-05-03 NOTE — TELEPHONE ENCOUNTER
[facility] is calling to clarify the dose of Levothyroxin.  Please call back [phone]

## 2022-07-30 ENCOUNTER — HEALTH MAINTENANCE LETTER (OUTPATIENT)
Age: 52
End: 2022-07-30

## 2022-08-10 ENCOUNTER — LAB (OUTPATIENT)
Dept: LAB | Facility: CLINIC | Age: 52
End: 2022-08-10
Payer: COMMERCIAL

## 2022-08-10 DIAGNOSIS — E06.3 HYPOTHYROIDISM DUE TO HASHIMOTO'S THYROIDITIS: ICD-10-CM

## 2022-08-10 DIAGNOSIS — Z11.59 NEED FOR HEPATITIS C SCREENING TEST: ICD-10-CM

## 2022-08-10 DIAGNOSIS — Z11.4 SCREENING FOR HIV (HUMAN IMMUNODEFICIENCY VIRUS): ICD-10-CM

## 2022-08-10 PROCEDURE — 84443 ASSAY THYROID STIM HORMONE: CPT

## 2022-08-10 PROCEDURE — 36415 COLL VENOUS BLD VENIPUNCTURE: CPT

## 2022-08-10 PROCEDURE — 84439 ASSAY OF FREE THYROXINE: CPT

## 2022-08-10 PROCEDURE — 86803 HEPATITIS C AB TEST: CPT

## 2022-08-10 PROCEDURE — 87389 HIV-1 AG W/HIV-1&-2 AB AG IA: CPT

## 2022-08-11 ENCOUNTER — MYC MEDICAL ADVICE (OUTPATIENT)
Dept: FAMILY MEDICINE | Facility: CLINIC | Age: 52
End: 2022-08-11

## 2022-08-11 DIAGNOSIS — E06.3 HYPOTHYROIDISM DUE TO HASHIMOTO'S THYROIDITIS: ICD-10-CM

## 2022-08-11 LAB
HCV AB SERPL QL IA: NONREACTIVE
HIV 1+2 AB+HIV1 P24 AG SERPL QL IA: NONREACTIVE
T4 FREE SERPL-MCNC: 1.09 NG/DL (ref 0.9–1.7)
TSH SERPL DL<=0.005 MIU/L-ACNC: 0.05 UIU/ML (ref 0.3–4.2)

## 2022-08-11 RX ORDER — LEVOTHYROXINE SODIUM 125 UG/1
125 TABLET ORAL DAILY
Qty: 90 TABLET | Refills: 3 | Status: SHIPPED | OUTPATIENT
Start: 2022-08-11 | End: 2023-10-11

## 2022-09-06 NOTE — ED TRIAGE NOTES
Pt had cold sx for 2 weeks with a cough. On Thursday, she developed sob, chest tightness. She went to the clinic today and her sts were low so the clinic sent her here. No covid test. No vaccinations. sats 91 % at rest in triage.   none none

## 2022-10-09 ENCOUNTER — HEALTH MAINTENANCE LETTER (OUTPATIENT)
Age: 52
End: 2022-10-09

## 2023-04-20 ENCOUNTER — PATIENT OUTREACH (OUTPATIENT)
Dept: CARE COORDINATION | Facility: CLINIC | Age: 53
End: 2023-04-20
Payer: COMMERCIAL

## 2023-06-10 ENCOUNTER — HEALTH MAINTENANCE LETTER (OUTPATIENT)
Age: 53
End: 2023-06-10

## 2023-08-19 ENCOUNTER — HEALTH MAINTENANCE LETTER (OUTPATIENT)
Age: 53
End: 2023-08-19

## 2023-12-26 ASSESSMENT — ENCOUNTER SYMPTOMS
DIARRHEA: 0
PALPITATIONS: 0
DIZZINESS: 0
DYSURIA: 0
HEMATOCHEZIA: 0
HEMATURIA: 0
PARESTHESIAS: 0
ABDOMINAL PAIN: 0
CHILLS: 0
FREQUENCY: 0
NERVOUS/ANXIOUS: 0
JOINT SWELLING: 0
COUGH: 1
HEADACHES: 0
HEARTBURN: 0
MYALGIAS: 0
SORE THROAT: 0
ARTHRALGIAS: 0
EYE PAIN: 0
BREAST MASS: 0
NAUSEA: 0
SHORTNESS OF BREATH: 0
WEAKNESS: 0
CONSTIPATION: 0
FEVER: 0

## 2024-01-02 ENCOUNTER — OFFICE VISIT (OUTPATIENT)
Dept: FAMILY MEDICINE | Facility: CLINIC | Age: 54
End: 2024-01-02
Payer: COMMERCIAL

## 2024-01-02 ENCOUNTER — ANCILLARY PROCEDURE (OUTPATIENT)
Dept: MAMMOGRAPHY | Facility: CLINIC | Age: 54
End: 2024-01-02
Attending: FAMILY MEDICINE
Payer: COMMERCIAL

## 2024-01-02 VITALS
DIASTOLIC BLOOD PRESSURE: 76 MMHG | BODY MASS INDEX: 29.94 KG/M2 | TEMPERATURE: 98 F | SYSTOLIC BLOOD PRESSURE: 105 MMHG | WEIGHT: 158.6 LBS | HEART RATE: 78 BPM | OXYGEN SATURATION: 98 % | RESPIRATION RATE: 16 BRPM | HEIGHT: 61 IN

## 2024-01-02 DIAGNOSIS — E78.00 HYPERCHOLESTEREMIA: ICD-10-CM

## 2024-01-02 DIAGNOSIS — Z00.00 ROUTINE GENERAL MEDICAL EXAMINATION AT A HEALTH CARE FACILITY: Primary | ICD-10-CM

## 2024-01-02 DIAGNOSIS — Z12.31 VISIT FOR SCREENING MAMMOGRAM: ICD-10-CM

## 2024-01-02 DIAGNOSIS — E06.3 HYPOTHYROIDISM DUE TO HASHIMOTO'S THYROIDITIS: ICD-10-CM

## 2024-01-02 LAB
ALBUMIN SERPL BCG-MCNC: 4.3 G/DL (ref 3.5–5.2)
ALP SERPL-CCNC: 147 U/L (ref 40–150)
ALT SERPL W P-5'-P-CCNC: 64 U/L (ref 0–50)
ANION GAP SERPL CALCULATED.3IONS-SCNC: 9 MMOL/L (ref 7–15)
AST SERPL W P-5'-P-CCNC: 42 U/L (ref 0–45)
BILIRUB SERPL-MCNC: 0.2 MG/DL
BUN SERPL-MCNC: 13.2 MG/DL (ref 6–20)
CALCIUM SERPL-MCNC: 9.5 MG/DL (ref 8.6–10)
CHLORIDE SERPL-SCNC: 106 MMOL/L (ref 98–107)
CHOLEST SERPL-MCNC: 276 MG/DL
CREAT SERPL-MCNC: 0.64 MG/DL (ref 0.51–0.95)
DEPRECATED HCO3 PLAS-SCNC: 27 MMOL/L (ref 22–29)
EGFRCR SERPLBLD CKD-EPI 2021: >90 ML/MIN/1.73M2
ERYTHROCYTE [DISTWIDTH] IN BLOOD BY AUTOMATED COUNT: 12.5 % (ref 10–15)
FASTING STATUS PATIENT QL REPORTED: YES
GLUCOSE SERPL-MCNC: 105 MG/DL (ref 70–99)
HCT VFR BLD AUTO: 41.9 % (ref 35–47)
HDLC SERPL-MCNC: 74 MG/DL
HGB BLD-MCNC: 13.8 G/DL (ref 11.7–15.7)
LDLC SERPL CALC-MCNC: 190 MG/DL
MCH RBC QN AUTO: 29.9 PG (ref 26.5–33)
MCHC RBC AUTO-ENTMCNC: 32.9 G/DL (ref 31.5–36.5)
MCV RBC AUTO: 91 FL (ref 78–100)
NONHDLC SERPL-MCNC: 202 MG/DL
PLATELET # BLD AUTO: 257 10E3/UL (ref 150–450)
POTASSIUM SERPL-SCNC: 4.2 MMOL/L (ref 3.4–5.3)
PROT SERPL-MCNC: 7.2 G/DL (ref 6.4–8.3)
RBC # BLD AUTO: 4.62 10E6/UL (ref 3.8–5.2)
SODIUM SERPL-SCNC: 142 MMOL/L (ref 135–145)
T4 FREE SERPL-MCNC: 2.08 NG/DL (ref 0.9–1.7)
TRIGL SERPL-MCNC: 58 MG/DL
TSH SERPL DL<=0.005 MIU/L-ACNC: 0.01 UIU/ML (ref 0.3–4.2)
WBC # BLD AUTO: 4.2 10E3/UL (ref 4–11)

## 2024-01-02 PROCEDURE — 77067 SCR MAMMO BI INCL CAD: CPT

## 2024-01-02 PROCEDURE — 99396 PREV VISIT EST AGE 40-64: CPT | Performed by: FAMILY MEDICINE

## 2024-01-02 PROCEDURE — 99214 OFFICE O/P EST MOD 30 MIN: CPT | Mod: 25 | Performed by: FAMILY MEDICINE

## 2024-01-02 PROCEDURE — 84439 ASSAY OF FREE THYROXINE: CPT | Performed by: FAMILY MEDICINE

## 2024-01-02 PROCEDURE — 85027 COMPLETE CBC AUTOMATED: CPT | Performed by: FAMILY MEDICINE

## 2024-01-02 PROCEDURE — 36415 COLL VENOUS BLD VENIPUNCTURE: CPT | Performed by: FAMILY MEDICINE

## 2024-01-02 PROCEDURE — 84443 ASSAY THYROID STIM HORMONE: CPT | Performed by: FAMILY MEDICINE

## 2024-01-02 PROCEDURE — 80053 COMPREHEN METABOLIC PANEL: CPT | Performed by: FAMILY MEDICINE

## 2024-01-02 PROCEDURE — 80061 LIPID PANEL: CPT | Performed by: FAMILY MEDICINE

## 2024-01-02 ASSESSMENT — ENCOUNTER SYMPTOMS
PARESTHESIAS: 0
FREQUENCY: 0
HEMATURIA: 0
BREAST MASS: 0
COUGH: 1
SHORTNESS OF BREATH: 0
DYSURIA: 0
DIARRHEA: 0
NAUSEA: 0
SORE THROAT: 0
CHILLS: 0
ARTHRALGIAS: 0
WEAKNESS: 0
EYE PAIN: 0
HEADACHES: 0
NERVOUS/ANXIOUS: 0
HEMATOCHEZIA: 0
MYALGIAS: 0
JOINT SWELLING: 0
FEVER: 0
PALPITATIONS: 0
CONSTIPATION: 0
HEARTBURN: 0
DIZZINESS: 0
ABDOMINAL PAIN: 0

## 2024-01-02 NOTE — PROGRESS NOTES
SUBJECTIVE:   Dyan is a 53 year old, presenting for the following:  Physical (Fasting.)        2024     9:35 AM   Additional Questions   Roomed by        Healthy Habits:     Getting at least 3 servings of Calcium per day:  Yes    Bi-annual eye exam:  Yes    Dental care twice a year:  Yes    Sleep apnea or symptoms of sleep apnea:  None    Diet:  Regular (no restrictions)    Frequency of exercise:  2-3 days/week    Duration of exercise:  15-30 minutes    Taking medications regularly:  Yes    Medication side effects:  Not applicable    Additional concerns today:  No      Today's PHQ-2 Score:       2024     9:33 AM   PHQ-2 (  [employer])   Q1: Little interest or pleasure in doing things 0   Q2: Feeling down, depressed or hopeless 0   PHQ-2 Score 0   Q1: Little interest or pleasure in doing things Not at all   Q2: Feeling down, depressed or hopeless Not at all   PHQ-2 Score 0           Dyan comes in today for annual exam.  When I saw her last time, her cholesterol was quite elevated.  She did start Lipitor and took it for a while but then just forgot to get it refilled so she has not been taking it.  She states she eats fairly healthy but does eat quite a bit of red meat.  She does have a family history of high cholesterol.  She is hypothyroidism and feels fairly asymptomatic at this time.  We have had to make some adjustments over the last couple of years.            Social History     Tobacco Use    Smoking status: Never    Smokeless tobacco: Never   Substance Use Topics    Alcohol use: Yes     Alcohol/week: 2.0 standard drinks of alcohol             2023     9:30 AM   Alcohol Use   Prescreen: >3 drinks/day or >7 drinks/week? No     Reviewed orders with patient.  Reviewed health maintenance and updated orders accordingly - Yes  Lab work is in process    Breast Cancer Screenin/25/2022     7:23 AM   Breast CA Risk Assessment (FHS-7)   Do you have a family history of breast, colon, or  "ovarian cancer? No / Unknown         Mammogram Screening: Recommended annual mammography  Pertinent mammograms are reviewed under the imaging tab.    History of abnormal Pap smear: NO - age 30-65 PAP every 5 years with negative HPV co-testing recommended      Latest Ref Rng & Units 2/10/2021     8:37 AM   PAP / HPV   PAP Negative for squamous intraepithelial lesion or malignancy. Negative for squamous intraepithelial lesion or malignancy  Electronically signed by Sosa Fierro CT (ASCP) on 2/17/2021 at 12:34 PM      HPV 16 DNA NEG Negative    HPV 18 DNA NEG Negative    Other HR HPV NEG Negative      Reviewed and updated as needed this visit by clinical staff   Tobacco  Allergies  Meds              Reviewed and updated as needed this visit by Provider                     Review of Systems   Constitutional:  Negative for chills and fever.   HENT:  Negative for congestion, ear pain, hearing loss and sore throat.    Eyes:  Negative for pain and visual disturbance.   Respiratory:  Positive for cough. Negative for shortness of breath.    Cardiovascular:  Negative for chest pain, palpitations and peripheral edema.   Gastrointestinal:  Negative for abdominal pain, constipation, diarrhea, heartburn, hematochezia and nausea.   Breasts:  Negative for tenderness, breast mass and discharge.   Genitourinary:  Negative for dysuria, frequency, genital sores, hematuria, pelvic pain, urgency, vaginal bleeding and vaginal discharge.   Musculoskeletal:  Negative for arthralgias, joint swelling and myalgias.   Skin:  Negative for rash.   Neurological:  Negative for dizziness, weakness, headaches and paresthesias.   Psychiatric/Behavioral:  Negative for mood changes. The patient is not nervous/anxious.           OBJECTIVE:   /76   Pulse 78   Temp 98  F (36.7  C)   Resp 16   Ht 1.543 m (5' 0.75\")   Wt 71.9 kg (158 lb 9.6 oz)   LMP  (LMP Unknown)   SpO2 98%   BMI 30.21 kg/m    Physical Exam  GENERAL: healthy, alert " and no distress  EYES: Eyes grossly normal to inspection, PERRL and conjunctivae and sclerae normal  HENT: ear canals and TM's normal, nose and mouth without ulcers or lesions  NECK: no adenopathy, no asymmetry, masses, or scars and thyroid normal to palpation  RESP: lungs clear to auscultation - no rales, rhonchi or wheezes  CV: regular rate and rhythm, normal S1 S2, no S3 or S4, no murmur, click or rub, no peripheral edema and peripheral pulses strong  ABDOMEN: soft, nontender, no hepatosplenomegaly, no masses and bowel sounds normal  MS: no gross musculoskeletal defects noted, no edema  SKIN: no suspicious lesions or rashes  NEURO: Normal strength and tone, mentation intact and speech normal  PSYCH: mentation appears normal, affect normal/bright    Diagnostic Test Results:  Labs reviewed in Epic  No results found for this or any previous visit (from the past 24 hour(s)).    ASSESSMENT/PLAN:   1. Routine general medical examination at a health care facility  Dyan comes in today for her annual exam.  Her next Pap smear is due in 2026.  She had a colonoscopy and is not due until 2031.  She has her mammogram scheduled today.    2. Hypothyroidism due to Hashimoto's thyroiditis  Her numbers have changed a little bit over the last couple of years and we have had to make some adjustments on her Synthroid dose.  It looks like her current dose of Synthroid is too high so we will send a lower dose and have her recheck in about 12 weeks.  - TSH with free T4 reflex; Future  - TSH with free T4 reflex    3. Hypercholesteremia  Her cholesterols been quite high over the last many years.  Would recommend restarting a statin.  Will send this into her pharmacy.  - CBC with platelets; Future  - Comprehensive metabolic panel; Future  - Lipid panel reflex to direct LDL Fasting; Future  - CBC with platelets  - Comprehensive metabolic panel  - Lipid panel reflex to direct LDL Fasting      Patient has been advised of split billing  requirements and indicates understanding: Yes      COUNSELING:  Reviewed preventive health counseling, as reflected in patient instructions       Regular exercise       Healthy diet/nutrition        She reports that she has never smoked. She has never used smokeless tobacco.          Leah Shetty MD  [facility]

## 2024-01-03 RX ORDER — LEVOTHYROXINE SODIUM 112 UG/1
112 TABLET ORAL DAILY
Qty: 90 TABLET | Refills: 3 | Status: SHIPPED | OUTPATIENT
Start: 2024-01-03

## 2024-01-03 RX ORDER — ATORVASTATIN CALCIUM 20 MG/1
20 TABLET, FILM COATED ORAL DAILY
Qty: 90 TABLET | Refills: 3 | Status: SHIPPED | OUTPATIENT
Start: 2024-01-03

## 2024-01-09 ENCOUNTER — ANCILLARY PROCEDURE (OUTPATIENT)
Dept: MAMMOGRAPHY | Facility: CLINIC | Age: 54
End: 2024-01-09
Attending: FAMILY MEDICINE
Payer: COMMERCIAL

## 2024-01-09 DIAGNOSIS — N64.89 BREAST ASYMMETRY: ICD-10-CM

## 2024-01-09 PROCEDURE — 77065 DX MAMMO INCL CAD UNI: CPT | Mod: LT

## 2025-01-26 ENCOUNTER — E-VISIT (OUTPATIENT)
Dept: URGENT CARE | Facility: CLINIC | Age: 55
End: 2025-01-26
Payer: COMMERCIAL

## 2025-01-26 DIAGNOSIS — R05.1 ACUTE COUGH: Primary | ICD-10-CM

## 2025-01-26 DIAGNOSIS — R06.2 WHEEZING: ICD-10-CM

## 2025-01-26 PROCEDURE — 99207 PR NON-BILLABLE SERV PER CHARTING: CPT | Performed by: PHYSICIAN ASSISTANT

## 2025-01-27 NOTE — PATIENT INSTRUCTIONS
Dear Dyan Lockett,    We are sorry you are not feeling well. Based on the responses you provided, it is recommended that you be seen in-person in urgent care so we can better evaluate your symptoms. Please click here to find the nearest urgent care location to you.   You will not be charged for this Visit. Thank you for trusting us with your care.    Lily Newman PA-C

## 2025-01-28 ENCOUNTER — VIRTUAL VISIT (OUTPATIENT)
Dept: URGENT CARE | Facility: CLINIC | Age: 55
End: 2025-01-28
Payer: COMMERCIAL

## 2025-01-28 DIAGNOSIS — R68.89 FLU-LIKE SYMPTOMS: ICD-10-CM

## 2025-01-28 DIAGNOSIS — J01.90 ACUTE SINUSITIS WITH SYMPTOMS > 10 DAYS: Primary | ICD-10-CM

## 2025-01-28 PROCEDURE — 98005 SYNCH AUDIO-VIDEO EST LOW 20: CPT

## 2025-01-28 RX ORDER — GUAIFENESIN 600 MG/1
1200 TABLET, EXTENDED RELEASE ORAL 2 TIMES DAILY
Qty: 30 TABLET | Refills: 0 | Status: SHIPPED | OUTPATIENT
Start: 2025-01-28

## 2025-01-28 RX ORDER — BENZONATATE 100 MG/1
200 CAPSULE ORAL
Qty: 30 CAPSULE | Refills: 0 | Status: SHIPPED | OUTPATIENT
Start: 2025-01-28

## 2025-01-28 NOTE — PROGRESS NOTES
Dyan is a 54 year old female who presents for a billable video visit.    ASSESSMENT/PLAN:  Diagnoses and all orders for this visit:    Acute sinusitis with symptoms > 10 days  -     amoxicillin-clavulanate (AUGMENTIN) 875-125 MG tablet; Take 1 tablet by mouth 2 times daily for 7 days.  -     benzonatate (TESSALON) 100 MG capsule; Take 2 capsules (200 mg) by mouth nightly as needed for cough.    Flu-like symptoms  -     guaiFENesin (MUCINEX) 600 MG 12 hr tablet; Take 2 tablets (1,200 mg) by mouth 2 times daily.  -     benzonatate (TESSALON) 100 MG capsule; Take 2 capsules (200 mg) by mouth nightly as needed for cough.    Patient has had cold symptoms for 1 month and recently started developing sinus pain, sinus pressure.  The symptoms are consistent with an acute rhino bacterial sinusitis.  She also reports new onset of bodyaches, fever, headache since 2 days ago.  Patient has flulike symptoms.  She never got tested for influenza.  She will be treated for a sinus infection.  If patient symptoms get worse,  advised patient to follow-up in the emergency room.    Follow up with Emergency room if symptoms worsen or fail to improve. Patient agreed to plan and verbalized understanding.     SUBJECTIVE:    Patient reports cold symptoms which have been on and off since 1 month ago.  She reports congestion and runny nose, cough for 1 month.  She also has sinus pain and sinus pressure which is worsened for the past 2 days.  She reports new onset of bodyaches and fever and headaches since 2 days ago.  She reports temp was 102.1  F when high.  She has been taking Advil intermittently.  She denies history of asthma.  She denies shortness of breath, chest pain or chest tightness.    ROS: Pertinent ROS neg other than the symptoms noted above in the HPI.     OBJECTIVE:  Vitals not done due to this being a virtual visit    GENERAL: healthy, alert and no distress  EYES: Eyes grossly normal to inspection,conjunctivae and sclerae  normal  Nose: congestion present  RESP: Able to speak in complete sentences, no audible wheeze or cough  SKIN: no suspicious lesions or rashes  NEURO: mentation intact and speech normal  PSYCH: mentation appears normal, affect normal/bright    Video-Visit Details    Type of service:  Video Visit  Video Start Time: 3:46 pm  Video End Time: 3:52 pm    Originating Location: [place]    Distant Location:  [facility]     Platform used for Video Visit: Edisno Newman PA-C

## 2025-02-22 ENCOUNTER — HEALTH MAINTENANCE LETTER (OUTPATIENT)
Age: 55
End: 2025-02-22